# Patient Record
Sex: FEMALE | Race: WHITE | Employment: OTHER | URBAN - METROPOLITAN AREA
[De-identification: names, ages, dates, MRNs, and addresses within clinical notes are randomized per-mention and may not be internally consistent; named-entity substitution may affect disease eponyms.]

---

## 2021-08-03 ENCOUNTER — HOSPITAL ENCOUNTER (OUTPATIENT)
Dept: PHYSICAL THERAPY | Age: 81
Discharge: HOME | End: 2021-08-03
Payer: MEDICARE

## 2021-08-03 PROCEDURE — 97110 THERAPEUTIC EXERCISES: CPT

## 2021-08-03 PROCEDURE — 97161 PT EVAL LOW COMPLEX 20 MIN: CPT

## 2021-08-03 NOTE — PROGRESS NOTES
PT DAILY TREATMENT NOTE/HIP RENA03-00    Patient Name: Harrison Vargas  Date:8/3/2021  : 1940  [x]  Patient  Verified  Payor: Kayla Gunders[place] / Plan: [place] MED[place] & B / Product Type: Medicare /    In OOFX:[id number]  Out EWYC:2665  Total Treatment Time (min): 61  Visit #: 1 of 24    Medicare/BCBS Only   Total Timed Codes (min):  23 1:1 Treatment Time:  59     Treatment Area: Right hip pain [M25.551]    SUBJECTIVE  Pain Level (0-10 scale): 2  []constant []intermittent []improving []worsening []no change since onset    Any medication changes, allergies to medications, adverse drug reactions, diagnosis change, or new procedure performed?: [x] No    [] Yes (see summary sheet for update)  Subjective functional status/changes:     Patient presents with right hip pain and difficulty walking s/p right partial hip replacement via posterior lateral approach performed on 2021 secondary to falling on July 3, 2021, while working in kitchen and tripped over a dog bowl resulting in hitting her head on the wall and fracturing her hip. Patient does not remember accident secondary to head trauma. Patient describes pain as soreness, dullness. Denies numbness/tingling. Denies popping/clicking. Aggravating factors:walking, sleeping. Alleviating factors: movement. Denies red flags: SOB, chest pain, dizziness/lightheadedness, blurred/double vision, HA, chills/fevers, night sweats, change in bowel/bladder control, abdominal pain, difficulty swallowing, slurred speech, unexplained weight gain/loss, nausea, vomiting. PMHx: thyroidism. Surgical Hx: colectomy Social Hx: Lives with  in a single story home with 4 steps to enter, alcohol, tobacco, work status. PLOF: exercise, gardening, yard work. CLOF: limited with weight bearing.  Diagnostic Imaging: Surgery     OBJECTIVE/EXAMINATION    36 min [x]Eval                  []Re-Eval       23 min Therapeutic Exercise:  [x] See flow sheet :   Rationale: increase ROM, increase strength and decrease pain to improve the patients ability to complete ADLs       With   [x] TE   [] TA   [] neuro   [] other: Patient Education: [x] Review HEP    [] Progressed/Changed HEP based on:   [] positioning   [] body mechanics   [] transfers   [] heat/ice application     [] other:        Physical Therapy Evaluation- Hip    Posture: Forward head and rounded shoulders    Gait:  [] Normal    [x] Abnormal    [x] Antalgic    [] NWB    Device: 3WW    Describe: Patient ambulates with modified independence utilizing a 3WW while demonstrating an antalgic gait pattern as she has reduced stance time on the right and decreased step length on the left. She decreases shanti significantly to negotiate turns and demonstrates fear avoidance behavior when ambulating a she is afraid to fall. ROM/Strength        AROM                   Strength (1-5)  Hip Left Right Left Right   Flexion 115 95 4 4-   Extension   4 3+   Abduction   4- 3+   Adduction   4- 4-   ER 35 25     IR 20 NT     Knee Left Right Left Right   Extension   4 4-   Flexion   4 4-                                     Palpation  [] Min  [x] Mod  [] Severe    Location: right hip   [] Min  [] Mod  [] Severe    Location:    Optional Tests    Other tests/ comments:  Surgical wound healing well no signs of infection, edema is present in posterior hip. Patient required significant UEA to perform SLS    TUG 14 seconds    Pain Level (0-10 scale) post treatment: 2    ASSESSMENT/Changes in Function:   Patient presents with right hip pain and difficulty walking s/p right partial hip replacement via posterior lateral approach performed on 7/5/2021 secondary to falling on July 3, 2021, while working in kitchen and tripped over a dog bowl resulting in hitting her head on the wall and fracturing her hip. Patient does not remember accident secondary to head trauma. She has decreased hip mobility. She has reduced strength in bilateral LEs right more than left. Patient ambulates with modified independence utilizing a 3WW while demonstrating an antalgic gait pattern as she has reduced stance time on the right and decreased step length on the left. She decreases shanti significantly to negotiate turns and demonstrates fear avoidance behavior when ambulating a she is afraid to fall. She has reduced balance and requires UEA to perform SLS. Patient presentation is consistent s/p partial hip replacement secondary to trauma. Patient will benefit from skilled PT services to modify and progress therapeutic interventions, address functional mobility deficits, address ROM deficits, address strength deficits, analyze and address soft tissue restrictions, analyze and cue movement patterns, analyze and modify body mechanics/ergonomics and assess and modify postural abnormalities to attain her goals.      [x]  See Plan of Care  []  See progress note/recertification  []  See Discharge Summary         Progress towards goals / Updated goals:  See POC    PLAN  []  Upgrade activities as tolerated     [x]  Continue plan of care  []  Update interventions per flow sheet       []  Discharge due to:_  []  Other:_      Mitchell Dominique, PT, DPT 8/3/2021  8:46 AM

## 2021-08-03 NOTE — PROGRESS NOTES
[facility] at [address] [place] [address] Drive: [phone]   Fax: [phone]  PLAN OF CARE / [address] [place] [address] FOR PHYSICAL THERAPY SERVICES  Patient Name: Cristy Leiva : 1940   Medical   Diagnosis: Right hip pain [M25.551] Treatment Diagnosis: Right hip pain   Onset Date: 2021     Referral Source: Sophie Boyle MD Start of Care [facility]): 8/3/2021   Prior Hospitalization: See medical history Provider #: [id number]   Prior Level of Function: exercise, gardening, yard work   Comorbidities: thyroid   Medications: Verified on Patient Summary List   The Plan of Care and following information is based on the information from the initial evaluation.   ===========================================================================================  Assessment / key information:  Cristy Leiva is a [de-identified] y.o.  female presents with  c/o right hip pain and difficulty walking s/p right partial hip replacement via posterior lateral approach performed on 2021 secondary to falling on July 3, 2021, while working in kitchen and tripped over a dog bowl resulting in hitting her head on the wall and fracturing her hip. Patient does not remember accident secondary to head trauma. She has decreased hip mobility. She has reduced strength in bilateral LEs right more than left. Patient ambulates with modified independence utilizing a 3WW while demonstrating an antalgic gait pattern as she has reduced stance time on the right and decreased step length on the left. She decreases shanti significantly to negotiate turns and demonstrates fear avoidance behavior when ambulating a she is afraid to fall. She has reduced balance and requires UEA to perform SLS. Patient presentation is consistent s/p partial hip replacement secondary to trauma.  Patient will benefit from skilled PT services to modify and progress therapeutic interventions, address functional mobility deficits, address ROM deficits, address strength deficits, analyze and address soft tissue restrictions, analyze and cue movement patterns, analyze and modify body mechanics/ergonomics and assess and modify postural abnormalities to attain her goals. .      ===========================================================================================  Eval Complexity: History MEDIUM  Complexity : 1-2 comorbidities / personal factors will impact the outcome/ POC ;  Examination  LOW Complexity : 1-2 Standardized tests and measures addressing body structure, function, activity limitation and / or participation in recreation ; Presentation LOW Complexity : Stable, uncomplicated ;  Decision Making MEDIUM Complexity : FOTO score of 26-74; Overall Complexity LOW   Problem List: pain affecting function, decrease ROM, decrease strength, edema affecting function, impaired gait/ balance, decrease ADL/ functional abilitiies, decrease activity tolerance, decrease flexibility/ joint mobility and decrease transfer abilities   Treatment Plan may include any combination of the following: Therapeutic exercise, Therapeutic activities, Neuromuscular re-education, Physical agent/modality, Gait/balance training, Manual therapy, Aquatic therapy, Patient education, Self Care training, Functional mobility training, Home safety training and Stair training  Patient / Family readiness to learn indicated by: asking questions, trying to perform skills and interest  Persons(s) to be included in education: patient (P)  Barriers to Learning/Limitations: no  Measures taken if barriers to learning:   Patient Goal (s): \"walk without help\"   Patient self reported health status: good  Rehabilitation Potential: good  Goals:  Short Term Goals: To be accomplished in 4 weeks:   Patient will report compliance with HEP 1x/day to aid in rehabilitation program.   Status at IE:Provided initial HEP   Current:Same as IE    Long Term Goals:  To be accomplished in 8 weeks:   Patient will increase bilateral LE strength to 5/5 MMT throughout to aid in completion of ADLs. Status at IE:3+/5   Current:Same as IE     Patient will report pain no greater than 1-2/10 throughout entire day to aid in completion of ADLs   Status at IE:2-5/10   Current:Same as IE     Patent will perform 10 sit<>stands pain-free to demonstrate improvement in status and aid and completion of ADLs. Status at IE:Requires UEA to perform STS tansfer   Current:Same as IE     Patient will improve FOTO score to 68 points to demonstrate improvement in functional status. Status at IE: FOTO score = 43 (an established functional score where 100 = no disability)   Current:Same as IE    Frequency / Duration:   Patient to be seen 3  times per week for 12  weeks:  Patient / Caregiver education and instruction: self care and exercises      Therapist Signature: Rick Wright PT, DPT Date: 1/3/2146   Certification Period: 8/3/2021 - 11/1/2021 Time: 12:48 PM   ===========================================================================================  I certify that the above Physical Therapy Services are being furnished while the patient is under my care. I agree with the treatment plan and certify that this therapy is necessary. Physician Signature:        Date:       Time:     Please sign and return to [facility] or you may fax the signed copy to [phone]. Thank you.

## 2021-08-04 ENCOUNTER — HOSPITAL ENCOUNTER (OUTPATIENT)
Dept: PHYSICAL THERAPY | Age: 81
Discharge: HOME | End: 2021-08-04
Payer: MEDICARE

## 2021-08-04 PROCEDURE — 97110 THERAPEUTIC EXERCISES: CPT

## 2021-08-04 PROCEDURE — 97530 THERAPEUTIC ACTIVITIES: CPT

## 2021-08-04 NOTE — PROGRESS NOTES
PT DAILY TREATMENT NOTE - [facility]     Patient Name: Belén Herrera  Date:2021  : 1940  [x]  Patient  Verified  Payor: Fiordaliza Botello / Plan: [place] MEDICARE PART A & B / Product Type: Medicare /    In time:930  Out time:  Total Treatment Time (min): 38  Total Timed Codes (min): 38   1:1 Treatment Time ( [address] only): 38   Visit #: 2 of 24    Treatment Area: Right hip pain [M25.551]    SUBJECTIVE  Pain Level (0-10 scale): 2  Any medication changes, allergies to medications, adverse drug reactions, diagnosis change, or new procedure performed?: [x] No    [] Yes (see summary sheet for update)  Subjective functional status/changes:   [] No changes reported    Patient reports right hip soreness and tightness. OBJECTIVE    30 min Therapeutic Exercise:  [x] See flow sheet :   Rationale: increase ROM, increase strength and decrease pain to improve the patients ability to complete ADLs    8 min Therapeutic Activity:  [x]  See flow sheet :   Rationale: increase ROM, increase strength and improve coordination  to improve the patients ability to complete ADLs           With   [x] TE   [] TA   [] neuro   [] other: Patient Education: [x] Review HEP    [] Progressed/Changed HEP based on:   [] positioning   [] body mechanics   [] transfers   [] heat/ice application    [] other:      Other Objective/Functional Measures: NA     Pain Level (0-10 scale) post treatment: 2    ASSESSMENT/Changes in Function: Patient responds well to treatment session. Patient required cues for activity pacing. Provided close supervision with weight bearing activities for safety. Reviewed HEP to promote good carryover at home. Also reviewed hip surgical precautions and she demonstrated understanding. No adverse effects were noted from today's treatment session.     Patient will continue to benefit from skilled PT services to modify and progress therapeutic interventions, address functional mobility deficits, address ROM deficits, address strength deficits, analyze and address soft tissue restrictions, analyze and cue movement patterns, analyze and modify body mechanics/ergonomics, assess and modify postural abnormalities, and instruct in home and community integration to attain remaining goals. []  See Plan of Care  []  See progress note/recertification  []  See Discharge Summary         Progress towards goals / Updated goals:  Short Term Goals: To be accomplished in 4 weeks:                 Patient will report compliance with HEP 1x/day to aid in rehabilitation program.                 Status at IE:Provided initial HEP                 Current: In-progress, reviewed HEP, 8/4/2021     Long Term Goals: To be accomplished in 8 weeks:                 Patient will increase bilateral LE strength to 5/5 MMT throughout to aid in completion of ADLs. Status at IE:3+/5                 Current:Same as IE                    Patient will report pain no greater than 1-2/10 throughout entire day to aid in completion of ADLs                 Status at IE:2-5/10                 Current:Same as IE                    Patent will perform 10 sit<>stands pain-free to demonstrate improvement in status and aid and completion of ADLs. Status at IE:Requires UEA to perform STS tansfer                 Current:Same as IE                    Patient will improve FOTO score to 68 points to demonstrate improvement in functional status.                  Status at IE: FOTO score = 43 (an established functional score where 100 = no disability)                 Current:Same as IE    PLAN  []  Upgrade activities as tolerated     [x]  Continue plan of care  []  Update interventions per flow sheet       []  Discharge due to:_  []  Other:_      Samm Garay, PT, DPT 8/4/2021  9:34 AM    Future Appointments   Date Time Provider [place]   8/10/2021  1:00 PM Alba Mederos [facility]   8/12/2021  1:00 PM Alxeandrea Chowdhury, DORI FULTON [facility] 8/16/2021 11:00 AM Celester Anon, PT [facility]   8/18/2021  1:45 PM Celester Anon, PT [facility]   8/23/2021  2:30 PM Celester Anon, PT [facility]   8/25/2021  2:30 PM Celester Anon, PT [facility]   8/27/2021  1:00 PM Shena Peace, PT [facility]   8/31/2021  2:30 PM Celester Anon, PT [facility]   9/2/2021  2:30 PM Celester Anon, PT [facility]   9/7/2021  2:30 PM Celester Anon, PT [facility]   9/9/2021  2:30 PM Celester Anon, PT [facility]   9/14/2021  2:30 PM Celester Anon, PT [facility]   9/16/2021  2:30 PM Celester Anon, PT [facility]

## 2021-08-06 ENCOUNTER — HOSPITAL ENCOUNTER (OUTPATIENT)
Dept: PHYSICAL THERAPY | Age: 81
Discharge: HOME | End: 2021-08-06
Payer: MEDICARE

## 2021-08-06 PROCEDURE — 97110 THERAPEUTIC EXERCISES: CPT

## 2021-08-06 PROCEDURE — 97530 THERAPEUTIC ACTIVITIES: CPT

## 2021-08-06 NOTE — PROGRESS NOTES
PT DAILY TREATMENT NOTE    Patient Name: Chris Bliss  Date:2021  : 1940  [x]  Patient  Verified  Payor: [place] MEDICARE / Plan: [place] MEDICARE PART A & B / Product Type: Medicare /    In time:11:00  Out time:11:40  Total Treatment Time (min): 40  Total Timed Codes (min): 40  1:1 Treatment Time ( only): 40   Visit #: 3 of 24    Treatment Area: Right hip pain [M25.551]    SUBJECTIVE  Pain Level (0-10 scale): 0/10  Any medication changes, allergies to medications, adverse drug reactions, diagnosis change, or new procedure performed?: [x] No    [] Yes (see summary sheet for update)  Subjective functional status/changes:   [] No changes reported  Pt reports she felt good after last session, she was a little sore, but it wasn't too bad. Pt reports the swelling continues to come down. OBJECTIVE      30 min Therapeutic Exercise:  [x] See flow sheet : pt required cuing during nustep to keep right knee straight up toward the ceiling as patient had it adducted and internally rotated. This helped to increase right hip external rotators. Pt required cuing during added LAQ to maintain hep precautions of not flexing past 90 degrees. Pt required visual and verbal cuing to perform SAQ correctly and decrease glut overcompensation and activation. Rationale: increase ROM, increase strength, improve coordination and increase proprioception to improve the patients ability to increase patient's ease with performing functional activities and decrease overall pain and symptoms. 10 min Therapeutic Activity:  [x]  See flow sheet : pt required cuing during sit to stands to ensure pt was keeping her chest up in order to not flex her trunk forward and break hip precautions. Pt was also verbally and visually cued to decrease bilateral dynamic knee valgus.     Rationale: increase strength, improve coordination, improve balance and increase proprioception  to improve the patients ability to increase patient's ease with performing functional activities and decrease overall pain and symptoms. With   [x] TE   [x] TA   [] neuro   [] other: Patient Education: [x] Review HEP    [] Progressed/Changed HEP based on:   [] positioning   [] body mechanics   [] transfers   [] heat/ice application    [x] other: reviewed hip precautions with pt, no bending over at the waist, no bending past 90, bringing knee up to chest past 90 degrees and no crossing legs. Other Objective/Functional Measures: pt enters gyms with tripod rollator. Scar: right lateral hip: healing well no s/s of infection, light bruising noted right gluteal/rotator area. Pain Level (0-10 scale) post treatment: 3/10    ASSESSMENT/Changes in Function: Patient tolerated treatment well with no adverse reactions today. Pt is progressing with therapy as indicated by pt tolerating increase in exercise repetitions and resistance. Patient required cuing for correct form with exercises as indicated above. Patient's scar is healing very well, some bruising noted in right gluteal region, but minimal. Pt is very tender to palpation in this area still. Pt demonstrates decreased right hip strength and control as indicated by dynamic bilateral knee valgus progression with most exercises. Following cuing pt was able to correct. Although showing progress patient would benefit from continuation of skilled physical therapy to address the remaining limitations. Patient will continue to benefit from skilled PT services to modify and progress therapeutic interventions, address functional mobility deficits, address ROM deficits, address strength deficits, analyze and address soft tissue restrictions, analyze and cue movement patterns, analyze and modify body mechanics/ergonomics and assess and modify postural abnormalities to attain remaining goals.      []  See Plan of Care  []  See progress note/recertification  []  See Discharge Summary         Progress towards goals / Updated goals:  Short Term Goals: To be accomplished in 4 weeks:                 AKBARK will report compliance with HEP 1x/day to aid in rehabilitation program.                 Status at IE:Provided initial HEP                 Current:Patient reports good compliance with HEP with no difficulties, progressing well. 8/6/21     Long Term Goals: To be accomplished in 8 weeks:                 WCTORYM will increase bilateral LE strength to 5/5 MMT throughout to aid in completion of ADLs.                Status at IE:3+/5                 Current:Same as IE                    Patient will report pain no greater than 1-2/10 throughout entire day to aid in completion of ADLs                 Status at IE:2-5/10                 Current:Same as IE                    Patent will perform 10 sit<>stands pain-free to demonstrate improvement in status and aid and completion of ADLs.                Status at IE:Requires UEA to perform STS tansfer                 Current:Same as IE                    Patient will improve FOTO score to 68 points to demonstrate improvement in functional status.                  Status at IE: FOTO score = 43 (an established functional score where 100 = no disability)                 Current:Same as IE       PLAN  [x]  Upgrade activities as tolerated     [x]  Continue plan of care  [x]  Update interventions per flow sheet       []  Discharge due to:_  []  Other:_      Asa Feliz PT 8/6/2021  11:03 AM    Future Appointments   Date Time Provider [place]   8/10/2021  1:00 PM Alba Vasquez [facility]   8/12/2021  1:00 PM Gatha Sever, PT [facility]   8/16/2021 11:00 AM Gatha Sever, PT [facility]   8/18/2021  1:45 PM Gatha Sever, PT [facility]   8/23/2021  2:30 PM Gatha Sever, PT [facility]   8/25/2021  2:30 PM Gatha Sever, PT [facility]   8/27/2021  1:00 PM Wale Peace PT [facility]   8/31/2021  2:30 PM Gatha Sever, PT [facility]   9/2/2021  2:30 PM Rito Harvye, PT [facility]   9/7/2021  2:30 PM Rito Harvey, PT [facility]   9/9/2021  2:30 PM Rito Harvey, PT [facility]   9/14/2021  2:30 PM Rito Harvey, PT [facility]   9/16/2021  2:30 PM Rito Harvey, PT [facility]

## 2021-08-10 ENCOUNTER — HOSPITAL ENCOUNTER (OUTPATIENT)
Dept: PHYSICAL THERAPY | Age: 81
Discharge: HOME | End: 2021-08-10
Payer: MEDICARE

## 2021-08-10 PROCEDURE — 97530 THERAPEUTIC ACTIVITIES: CPT

## 2021-08-10 PROCEDURE — 97110 THERAPEUTIC EXERCISES: CPT

## 2021-08-10 NOTE — PROGRESS NOTES
PT DAILY TREATMENT NOTE    Patient Name: Alba Falcon  Date:8/10/2021  : 1940  [x]  Patient  Verified  Payor: [place] MED[place]ARE / Plan: [place] MEDICARE PART A & B / Product Type: Medicare /    In time:12:58  Out time:1:43  Total Treatment Time (min): 45  Total Timed Codes (min): 45  1:1 Treatment Time ( [address] only): 39   Visit #: 4 of 24    Treatment Area: Right hip pain [M25.551]    SUBJECTIVE  Pain Level (0-10 scale): 2/10  Any medication changes, allergies to medications, adverse drug reactions, diagnosis change, or new procedure performed?: [x] No    [] Yes (see summary sheet for update)  Subjective functional status/changes:   [] No changes reported  Pt reports she felt fine after last session. OBJECTIVE      30 min Therapeutic Exercise:  [x] See flow sheet :   Rationale: increase ROM, increase strength, improve coordination and increase proprioception to improve the patients ability to increase patient's ease with performing functional activities and decrease overall pain and symptoms. 15 min Therapeutic Activity:  [x]  See flow sheet :   Rationale: increase strength, improve coordination, improve balance and increase proprioception  to improve the patients ability to increase patient's ease with performing functional activities and decrease overall pain and symptoms. With   [x] TE   [x] TA   [] neuro   [] other: Patient Education: [x] Review HEP    [] Progressed/Changed HEP based on:   [] positioning   [] body mechanics   [] transfers   [] heat/ice application    [x] other: pt was educated on scar massage to perform 2 times a day for 3-5 minutes. Other Objective/Functional Measures: pt entered gym with tripod rollator. Pain Level (0-10 scale) post treatment: 2-3/10    ASSESSMENT/Changes in Function: Patient tolerated treatment well with no adverse reactions today. Pt is progressing with therapy as indicated by pt tolerating increase in exercise repetitions and resistance.  Pt continues to demonstrate dynamic right knee valgus during sit to stands however, required less verbal and visual cuing to correct today. Pt required min A to move right LE into abduction range in supine due to decreased strength, pt did get a good adductor stretch with this movement. Initiated sitting right hip internal and external rotation isometrics today, pt with significant weakness with right hip external rotation. Educated patient on scar massage and pt demonstrated and reported understanding. Although showing progress patient would benefit from continuation of skilled physical therapy to address the remaining limitations. Patient will continue to benefit from skilled PT services to modify and progress therapeutic interventions, address functional mobility deficits, address ROM deficits, address strength deficits, analyze and address soft tissue restrictions, analyze and cue movement patterns, analyze and modify body mechanics/ergonomics and assess and modify postural abnormalities to attain remaining goals. []  See Plan of Care  []  See progress note/recertification  []  See Discharge Summary         Progress towards goals / Updated goals:  Short Term Goals: To be accomplished in 4 weeks:                 DFYRVAM will report compliance with HEP 1x/day to aid in rehabilitation program.                 Status at IE:Provided initial HEP                 Current:Patient reports good compliance with HEP with no difficulties, progressing well. 8/6/21     Long Term Goals: To be accomplished in 8 weeks:                 JMHDLIX will increase bilateral LE strength to 5/5 MMT throughout to aid in completion of ADLs.                  Status at IE:3+/5                 Current:Same as IE                    Patient will report pain no greater than 1-2/10 throughout entire day to aid in completion of ADLs                 Status at IE:2-5/10                 Current:Same as IE                    Patent will perform 10 sit<>stands pain-free to demonstrate improvement in status and aid and completion of ADLs.                Status at IE:Requires UEA to perform STS tansfer                 DWIURCN: required min UEA to perform STS transfer 2 sets of 10 with no more than 3/10 pain. Progressing 8/10/21                    Patient will improve FOTO score to 68 points to demonstrate improvement in functional status.                Status at IE: FOTO score = 43 (an established functional score where 100 = no disability)                 Current:Same as IE       PLAN  [x]  Upgrade activities as tolerated     [x]  Continue plan of care  [x]  Update interventions per flow sheet       []  Discharge due to:_  [x]  Other:_  FOTO to be assessed next visit.      Enrique Cooper 8/10/2021  1:00 PM    Future Appointments   Date Time Provider [place]   8/12/2021  1:00 PM Melissa Bhakta, PT [facility]   8/16/2021 11:00 AM Melissa Bhakta, PT [facility]   8/18/2021  1:45 PM Braxtone Livia, PT [facility]   8/23/2021  2:30 PM Braxtone Livia, PT [facility]   8/25/2021  2:30 PM Braxtone Livia, PT [facility]   8/27/2021  1:00 PM Kimberli Peace, PT [facility]   8/31/2021  2:30 PM Braxtone Livia, PT [facility]   9/2/2021  2:30 PM Clarance Livia, PT [facility]   9/7/2021  2:30 PM Clarance Livia, PT [facility] [place]   9/9/2021  2:30 PM Clarance Livia, PT [facility]   9/14/2021  2:30 PM Clarance Livia, PT [facility]   9/16/2021  2:30 PM Melissa Bhakta PT [facility]

## 2021-08-12 ENCOUNTER — APPOINTMENT (OUTPATIENT)
Dept: PHYSICAL THERAPY | Age: 81
End: 2021-08-12
Payer: MEDICARE

## 2021-08-16 ENCOUNTER — HOSPITAL ENCOUNTER (OUTPATIENT)
Dept: PHYSICAL THERAPY | Age: 81
Discharge: HOME | End: 2021-08-16
Payer: MEDICARE

## 2021-08-16 PROCEDURE — 97530 THERAPEUTIC ACTIVITIES: CPT

## 2021-08-16 PROCEDURE — 97110 THERAPEUTIC EXERCISES: CPT

## 2021-08-16 PROCEDURE — 97112 NEUROMUSCULAR REEDUCATION: CPT

## 2021-08-16 NOTE — PROGRESS NOTES
PT DAILY TREATMENT NOTE - [facility]     Patient Name: Akash Dykes  Date:2021  : 1940  [x]  Patient  Verified  Payor: Hetal Caldera / Plan: [place] MEDICARE PART A & B / Product Type: Medicare /    In time:1100  Out time:1145  Total Treatment Time (min): 45  Total Timed Codes (min): 45   1:1 Treatment Time ( [address] only): 39   Visit #: 5 of 24    Treatment Area: Right hip pain [M25.551]    SUBJECTIVE  Pain Level (0-10 scale):1- 2  Any medication changes, allergies to medications, adverse drug reactions, diagnosis change, or new procedure performed?: [x] No    [] Yes (see summary sheet for update)  Subjective functional status/changes:   [] No changes reported  Patient rpeorts that she is still developing consistency with HEP as she is doing some of her exercises. OBJECTIVE    22 min Therapeutic Exercise:  [x] See flow sheet :   Rationale: increase ROM, increase strength and decrease pain to improve the patients ability to complete ADLs    15 min Therapeutic Activity:  [x]  See flow sheet :   Rationale: increase ROM, increase strength and improve coordination  to improve the patients ability to complete ADLs     8 min Neuromuscular Re-education:  -  See flow sheet :   Rationale: improve coordination, improve balance and increase proprioception  to improve the patients ability to complete ADLs          With   [x] TE   [] TA   [] neuro   [] other: Patient Education: [x] Review HEP    [] Progressed/Changed HEP based on:   [] positioning   [] body mechanics   [] transfers   [] heat/ice application    [] other:      Other Objective/Functional Measures: NA     Pain Level (0-10 scale) post treatment: 1    ASSESSMENT/Changes in Function: Patient responds well to treatment session. Provided encouragement through treatment for patient to complete all repetitions tasked. She required several cues to recall exercise parameters. She continues to display genu valgum with squat and bridge exercise.  Provided verbal cues and she was able to correct mechanics. No adverse effects were noted from today's treatment session    Patient will continue to benefit from skilled PT services to modify and progress therapeutic interventions, address functional mobility deficits, address ROM deficits, address strength deficits, analyze and address soft tissue restrictions, analyze and cue movement patterns, analyze and modify body mechanics/ergonomics, assess and modify postural abnormalities, address imbalance and instruct in home and community integration to attain remaining goals. []  See Plan of Care  []  See progress note/recertification  []  See Discharge Summary         Progress towards goals / Updated goals:  Short Term Goals: To be accomplished in 4 weeks:                 ZZTTEXR will report compliance with HEP 1x/day to aid in rehabilitation program.                 Status at IE:Provided initial HEP                 IIDHTIP: In-progress, developing consistency with HEP, 8/16/2021     Long Term Goals: To be accomplished in 8 weeks:                 LQHBNFY will increase bilateral LE strength to 5/5 MMT throughout to aid in completion of ADLs.                Status at IE:3+/5                 Current:Same as IE                    Patient will report pain no greater than 1-2/10 throughout entire day to aid in completion of ADLs                 Status at IE:2-5/10                 Current:Same as IE                    Patent will perform 10 sit<>stands pain-free to demonstrate improvement in status and aid and completion of ADLs.                Status at IE:Requires UEA to perform STS tansfer                 ENPBOMF: required min UEA to perform STS transfer 2 sets of 10 with no more than 3/10 pain. Progressing 8/10/21                    Patient will improve FOTO score to 68 points to demonstrate improvement in functional status.                  Status at IE: FOTO score = 43 (an established functional score where 100 = no disability)                 Current:Same as IE    PLAN  []  Upgrade activities as tolerated     [x]  Continue plan of care  []  Update interventions per flow sheet       []  Discharge due to:_  []  Other:_      Muna Cooper, PT, DPT 8/16/2021  11:42 AM    Future Appointments   Date Time Provider [place]   8/18/2021  1:45 PM Rito Harvey, PT [facility]   8/23/2021  2:30 PM Rito Harvey, PT [facility]   8/25/2021  2:30 PM Rito Harvey, PT [facility]   8/27/2021  1:00 PM Ruby Peace, PT [facility]   8/31/2021  2:30 PM Rito Harvey, PT [facility]   9/2/2021  2:30 PM Rito Harvey, PT [facility]   9/7/2021  2:30 PM Rito Harvey, PT [facility]   9/9/2021  2:30 PM Rito Harvey, PT [facility]   9/14/2021  2:30 PM Rito Harvey, PT [facility]   9/16/2021  2:30 PM Rito Harvey, PT [facility]

## 2021-08-18 ENCOUNTER — HOSPITAL ENCOUNTER (OUTPATIENT)
Dept: PHYSICAL THERAPY | Age: 81
Discharge: HOME | End: 2021-08-18
Payer: MEDICARE

## 2021-08-18 PROCEDURE — 97110 THERAPEUTIC EXERCISES: CPT

## 2021-08-18 PROCEDURE — 97530 THERAPEUTIC ACTIVITIES: CPT

## 2021-08-18 NOTE — PROGRESS NOTES
PT DAILY TREATMENT NOTE - [facility]     Patient Name: Alba Falcon  Date:2021  : 1940  [x]  Patient  Verified  Payor: [place] MEDICARE / Plan: [place] MEDICARE PART A & B / Product Type: Medicare /    In time:1347  Out time:1430  Total Treatment Time (min): 43  Total Timed Codes (min): 43   1:1 Treatment Time ([facility] only): 37   Visit #: 6 of 24    Treatment Area: Right hip pain [M25.551]    SUBJECTIVE  Pain Level (0-10 scale): 0  Any medication changes, allergies to medications, adverse drug reactions, diagnosis change, or new procedure performed?: [x] No    [] Yes (see summary sheet for update)  Subjective functional status/changes:   [] No changes reported    Patient reports that she feels tired today as she had a doctor appointment this morning. OBJECTIVE    33 min Therapeutic Exercise:  [x] See flow sheet :   Rationale: increase ROM, increase strength and decrease pain to improve the patients ability to complete ADLs    10 min Therapeutic Activity:  [x]  See flow sheet :   Rationale: increase ROM, increase strength and improve coordination  to improve the patients ability to complete ADLs        With   [x] TE   [] TA   [] neuro   [] other: Patient Education: [x] Review HEP    [] Progressed/Changed HEP based on:   [] positioning   [] body mechanics   [] transfers   [] heat/ice application    [] other:      Other Objective/Functional Measures: NA     Pain Level (0-10 scale) post treatment: 0    ASSESSMENT/Changes in Function: Patient responds well to treatment session. Patient required CGA with marching other weight bearing activities. Provided verbal cues to reduced trunk compensatory strategies with hip abduction. Provided assitance with SB hamstring curl to promote proper mechanics while maintaining hip precautions.  No adverse effects were noted from today's treatment session      Patient will continue to benefit from skilled PT services to modify and progress therapeutic interventions, address functional mobility deficits, address ROM deficits, address strength deficits, analyze and address soft tissue restrictions, analyze and cue movement patterns, analyze and modify body mechanics/ergonomics, assess and modify postural abnormalities, address imbalance and instruct in home and community integration to attain remaining goals. []  See Plan of Care  []  See progress note/recertification  []  See Discharge Summary         Progress towards goals / Updated goals:  Short Term Goals: To be accomplished in 4 weeks:                 CZIIDDP will report compliance with HEP 1x/day to aid in rehabilitation program.                 Status at IE:Provided initial HEP                 LZEJARV: In-progress, developing consistency with HEP, 8/16/2021     Long Term Goals: To be accomplished in 8 weeks:                 XHUOAXL will increase bilateral LE strength to 5/5 MMT throughout to aid in completion of ADLs.                Status at IE:3+/5                 Current:Same as IE                    Patient will report pain no greater than 1-2/10 throughout entire day to aid in completion of ADLs                 Status at IE:2-5/10                 Current:Same as IE                    Patent will perform 10 sit<>stands pain-free to demonstrate improvement in status and aid and completion of ADLs.                Status at IE:Requires UEA to perform STS tansfer                 Current: required min UEA to perform STS transfer 2 sets of 10 with no more than 3/10 pain. Progressing 8/10/21                    Patient will improve FOTO score to 68 points to demonstrate improvement in functional status.                  Status at IE: FOTO score = 43 (an established functional score where 100 = no disability)                 Current:Same as IE    PLAN  []  Upgrade activities as tolerated     [x]  Continue plan of care  []  Update interventions per flow sheet       []  Discharge due to:_  []  Other:_      Muna Cooper, PT, DPT 8/18/2021  1:40 PM    Future Appointments   Date Time Provider [place]   8/18/2021  1:45 PM El Flirt, PT [facility] [place]   8/23/2021  2:30 PM El Flirt, PT [facility] [place]   8/25/2021  2:30 PM El Flirt, PT [facility]   8/27/2021  1:00 PM Kb Peace, PT [facility]   8/31/2021  2:30 PM El Flirt, PT [facility] [place]   9/2/2021  2:30 PM El Flirt, PT [facility] [place]   9/7/2021  2:30 PM El Flirt, PT [facility] [place]   9/9/2021  2:30 PM El Flirt, PT [facility]   9/14/2021  2:30 PM El Flirt, PT [facility]   9/16/2021  2:30 PM El Flirt, PT [facility]

## 2021-08-23 ENCOUNTER — HOSPITAL ENCOUNTER (OUTPATIENT)
Dept: PHYSICAL THERAPY | Age: 81
Discharge: HOME | End: 2021-08-23
Payer: MEDICARE

## 2021-08-23 PROCEDURE — 97530 THERAPEUTIC ACTIVITIES: CPT

## 2021-08-23 PROCEDURE — 97110 THERAPEUTIC EXERCISES: CPT

## 2021-08-23 NOTE — PROGRESS NOTES
PT DAILY TREATMENT NOTE - [facility]     Patient Name: Harrison Vargas  Date:2021  : 1940  [x]  Patient  Verified  Payor: Kayla Juarez / Plan: [place] MEDICARE PART A & B / Product Type: Medicare /    In time:1430  Out time:1515  Total Treatment Time (min): 45  Total Timed Codes (min): 203   1:1 Treatment Time ( [address] only): 39    Visit #: 7 of 24    Treatment Area: Right hip pain [M25.551]     SUBJECTIVE  Pain Level (0-10 scale): 0  Any medication changes, allergies to medications, adverse drug reactions, diagnosis change, or new procedure performed?: [x] No    [] Yes (see summary sheet for update)  Subjective functional status/changes:   [] No changes reported  Patient reports that she is still developing compliance with HEP. OBJECTIVE    35 min Therapeutic Exercise:  [] See flow sheet :   Rationale: increase ROM, increase strength and decrease pain to improve the patients ability to complete ADLs    10 min Therapeutic Activity:  []  See flow sheet :   Rationale: increase ROM, increase strength and improve coordination  to improve the patients ability to complete ADLs           With   [x] TE   [] TA   [] neuro   [] other: Patient Education: [x] Review HEP    [] Progressed/Changed HEP based on:   [] positioning   [] body mechanics   [] transfers   [] heat/ice application    [] other:      Other Objective/Functional Measures: NA     Pain Level (0-10 scale) post treatment: 0    ASSESSMENT/Changes in Function: Patient responds well to treatment session. Patient required steady cues to recall exercise parameters. She demonstrated improved activity tolerance having fewer c/o discomfort with exercise. Will advance exercise next visit.  No adverse effects were noted from today's treatment session    Patient will continue to benefit from skilled PT services to modify and progress therapeutic interventions, address functional mobility deficits, address ROM deficits, address strength deficits, analyze and address soft tissue restrictions, analyze and cue movement patterns, analyze and modify body mechanics/ergonomics, assess and modify postural abnormalities, address imbalance and instruct in home and community integration to attain remaining goals. []  See Plan of Care  []  See progress note/recertification  []  See Discharge Summary         Progress towards goals / Updated goals:  Short Term Goals: To be accomplished in 4 weeks:                 LNUUNEO will report compliance with HEP 1x/day to aid in rehabilitation program.                 Status at IE:Provided initial HEP                 Current: In-progress, developing consistency with HEP, 8/16/2021     Long Term Goals: To be accomplished in 8 weeks:                 KUZSGWF will increase bilateral LE strength to 5/5 MMT throughout to aid in completion of ADLs.                Status at IE:3+/5                 Current:Same as IE                    Patient will report pain no greater than 1-2/10 throughout entire day to aid in completion of ADLs                 Status at IE:2-5/10                 Current: In-progress, 0-3/10, 8/23/2021                    Patent will perform 10 sit<>stands pain-free to demonstrate improvement in status and aid and completion of ADLs.                Status at IE:Requires UEA to perform STS tansfer                 Current: required min UEA to perform STS transfer 2 sets of 10 with no more than 3/10 pain. Progressing 8/10/21                    Patient will improve FOTO score to 68 points to demonstrate improvement in functional status.                  Status at IE: FOTO score = 43 (an established functional score where 100 = no disability)                 Current:Same as IE    PLAN  []  Upgrade activities as tolerated     [x]  Continue plan of care  []  Update interventions per flow sheet       []  Discharge due to:_  []  Other:_      Eris Pérez, PT, DPT 8/23/2021  2:36 PM    Future Appointments   Date Time Provider Department Center   8/25/2021  2:30 PM [place], PT [facility] [place]   8/27/2021  1:00 PM Shikha Peace, PT [facility]   8/31/2021  2:30 PM [place] Cam, PT [facility]   9/2/2021  2:30 PM [place] Cam, PT [facility]   9/7/2021  2:30 PM [place] Cam, PT [facility]   9/9/2021  2:30 PM [place], [facility]   9/14/2021  2:30 PM [place] Cam, PT [facility]   9/16/2021  2:30 PM [place] Cam, PT [facility]

## 2021-08-25 ENCOUNTER — HOSPITAL ENCOUNTER (OUTPATIENT)
Dept: PHYSICAL THERAPY | Age: 81
Discharge: HOME | End: 2021-08-25
Payer: MEDICARE

## 2021-08-25 PROCEDURE — 97116 GAIT TRAINING THERAPY: CPT

## 2021-08-25 PROCEDURE — 97110 THERAPEUTIC EXERCISES: CPT

## 2021-08-25 PROCEDURE — 97530 THERAPEUTIC ACTIVITIES: CPT

## 2021-08-25 NOTE — PROGRESS NOTES
PT DAILY TREATMENT NOTE - [facility]     Patient Name: Mohinder Jacobo  Date:2021  : 1940  [x]  Patient  Verified  Payor: [place] MEDICARE / Plan: [place] MEDICARE PART A & B / Product Type: Medicare /    In time:1430  Out time:1525  Total Treatment Time (min): 55  Total Timed Codes (min): 55   1:1 Treatment Time ([facility] only): 55   Visit #: 8 of 24    Treatment Area: Right hip pain [M25.551]    SUBJECTIVE  Pain Level (0-10 scale): 2  Any medication changes, allergies to medications, adverse drug reactions, diagnosis change, or new procedure performed?: [x] No    [] Yes (see summary sheet for update)  Subjective functional status/changes:   [] No changes reported    Patient reports that she is doing well. She states that she has some hip soreness. She states that she brought a cane to learn how to use it. OBJECTIVE    35 min Therapeutic Exercise:  [x] See flow sheet :   Rationale: increase ROM, increase strength and decrease pain to improve the patients ability to complete ADLs    10 min Therapeutic Activity:  [x]  See flow sheet :   Rationale: increase ROM, increase strength and improve coordination  to improve the patients ability to complete ADLs    10 min Gait Trainin feet with SPC on level surfaces with close supervision level of assist   Rationale: With   [x] TE   [] TA   [] neuro   [] other: Patient Education: [x] Review HEP    [] Progressed/Changed HEP based on:   [] positioning   [] body mechanics   [] transfers   [] heat/ice application    [] other:      Other Objective/Functional Measures: NA     Pain Level (0-10 scale) post treatment: 0    ASSESSMENT/Changes in Function: Patient responds well to treatment session. Adjusted patient's cane to promote proper gait mechanics and safety. Educated patient on proper gait sequence with SPC. Patient was able to ambulate 480 feet with close supervision for safety.  No adverse effects were noted from today's treatment session    Patient will continue to benefit from skilled PT services to modify and progress therapeutic interventions, address functional mobility deficits, address ROM deficits, address strength deficits, analyze and address soft tissue restrictions, analyze and cue movement patterns, analyze and modify body mechanics/ergonomics, assess and modify postural abnormalities, address imbalance and instruct in home and community integration to attain remaining goals. []  See Plan of Care  []  See progress note/recertification  []  See Discharge Summary         Progress towards goals / Updated goals:  Short Term Goals: To be accomplished in 4 weeks:                 BPQACXQ will report compliance with HEP 1x/day to aid in rehabilitation program.                 Status at IE:Provided initial HEP                 Current: In-progress, developing consistency with HEP, 8/16/2021     Long Term Goals: To be accomplished in 8 weeks:                 CXTFZES will increase bilateral LE strength to 5/5 MMT throughout to aid in completion of ADLs.                Status at IE:3+/5                 Current:Same as IE                    Patient will report pain no greater than 1-2/10 throughout entire day to aid in completion of ADLs                 Status at IE:2-5/10                 Current: In-progress, 0-3/10, 8/23/2021                    Patent will perform 10 sit<>stands pain-free to demonstrate improvement in status and aid and completion of ADLs.                Status at IE:Requires UEA to perform STS tansfer                 Current: In-progress, Performed 2 x 10 STS from 19 inch seat height with no UEA, 8/25/2021                    Patient will improve FOTO score to 68 points to demonstrate improvement in functional status.                Status at IE: FOTO score = 43 (an established functional score where 100 = no disability)                 Current:  In-progress, 48, 8/18/2021    PLAN  []  Upgrade activities as tolerated     [x]  Continue plan of care  []  Update interventions per flow sheet       []  Discharge due to:_  []  Other:_      Gatito Hedrick, PT, DPT 8/25/2021  2:32 PM    Future Appointments   Date Time Provider [place]   8/27/2021  1:00 PM Zhanna Eden, PT [facility]   8/31/2021  2:30 PM Gale Liudmila, PT [facility]   9/2/2021  2:30 PM Gale Liudmila, PT [facility]   9/7/2021  2:30 PM Gale Liudmila, PT [facility]   9/9/2021  2:30 PM Gale Liudmila, PT [facility]   9/14/2021  2:30 PM Gale Liudmila, PT [facility]   9/16/2021  2:30 PM Gale Liudmila, PT [facility]

## 2021-08-27 ENCOUNTER — HOSPITAL ENCOUNTER (OUTPATIENT)
Dept: PHYSICAL THERAPY | Age: 81
Discharge: HOME | End: 2021-08-27
Payer: MEDICARE

## 2021-08-27 PROCEDURE — 97112 NEUROMUSCULAR REEDUCATION: CPT

## 2021-08-27 PROCEDURE — 97530 THERAPEUTIC ACTIVITIES: CPT

## 2021-08-27 PROCEDURE — 97110 THERAPEUTIC EXERCISES: CPT

## 2021-08-27 NOTE — PROGRESS NOTES
PT DAILY TREATMENT NOTE    Patient Name: Samia Ching  Date:2021  : 1940  [x]  Patient  Verified  Payor: Ericka Ward / Plan: [place] MEDICARE PART A & B / Product Type: Medicare /    In Time: 12:58  Out Time: 1:45  Total Treatment Time (min):     47  Total Timed Codes (min):         47  1:1 Treatment Time ( [address] only): 42   Visit #:      Treatment Area: Right hip pain [M25.551]    SUBJECTIVE  Pre-Treatment Pain Level (out of 0-10 scale): 2  Subjective Functional Status/Changes: \"I feel pretty good today, just some minor pain along my right hip today. \"    Any medication changes, allergies to medications, adverse drug reactions, diagnosis change, or new procedure performed?: [x] No    [] Yes (see summary sheet for update)    OBJECTIVE     23 min Therapeutic Exercise:  [x] See flow sheet   Rationale: increase ROM, increase strength, and decrease pain to improve the patients ability to perform ADLs    9 min Therapeutic Activity:  [x] See flow sheet   Rationale: increase ROM, increase strength, and improve coordination to improve the patients ability to perform ADLs    10 min Neuromuscular Re-Education:  [x] See flow sheet   Rationale: improve coordination, improve balance/proprioception, improve posture, and improve core stabilization to improve the patients ability to perform ADLs and improve stability during static/dynamic movements    With   [x] TE   [] TA   [] neuro   [] other: Patient Education: [x] Review HEP    [] Progressed/Changed HEP based on:   [] positioning   [] body mechanics   [] transfers   [] heat/ice application    [] other: verbal and/or tactile cueing prn to improve performance/body alignment during therapeutic exercises       Other Objective/Functional Measures: n/a    Post-Treatment Pain Level (out of 0-10 scale): 0    [x]  See Plan of Care  []  See Progress Note/Recertification  []  See Discharge Summary    ASSESSMENT  Patient responded well to today's treatment without any adverse reactions. Patient able to progress to 3 laps during moving marches today, though she did require handheld assist secondary to decreased balance and she also became fatigued about custodial into the third lap. She did well with the addition of further strengthening with: supine marching, supine clams with peach resistance band, as well as the increase to a 3# weight during standing hamstring curls. Overall making continual progress towards goals. Continue per POC. Patient will continue to benefit from skilled PT services to modify and progress therapeutic interventions, address functional mobility deficits, address ROM deficits, address strength deficits, analyze and address soft tissue restrictions, analyze and cue movement patterns, analyze and modify body mechanics/ergonomics, assess and modify postural abnormalities, and instruct in home and community integration to attain remaining goals. Progress Towards Goals/Updated Goals:  Short Term Goals: To be accomplished in 4 weeks:                 KALPESHCORRINA will report compliance with HEP 1x/day to aid in rehabilitation program.                 Status at IE:Provided initial HEP   Current: Patient states she tries to do \"some\" exercises every day, but she's not sure she's getting through all of them. 8/27/21, in progress     Long Term Goals: To be accomplished in 8 weeks:                 ROGELIOU will increase bilateral LE strength to 5/5 MMT throughout to aid in completion of ADLs.                Status at IE:3+/5                 Current:Same as IE                    Patient will report pain no greater than 1-2/10 throughout entire day to aid in completion of ADLs                 Status at IE:2-5/10                 Current: In-progress, 0-3/10, 8/23/2021                    Patent will perform 10 sit<>stands pain-free to demonstrate improvement in status and aid and completion of ADLs.                  Status at IE:Requires UEA to perform STS kiley                 Current: In-progress, Performed 2 x 10 STS from 19 inch seat height with no UEA, 8/25/2021                    Patient will improve FOTO score to 68 points to demonstrate improvement in functional status.                Status at IE: FOTO score = 43 (an established functional score where 100 = no disability)                 Current: In-progress, 48, 8/18/2021    PLAN  [x]  Continue per Plan of Care  []  Upgrade activities as tolerated       []  Update interventions per flow sheet       []  Discharge due to: _  []  Other: _      Silvino Minor.  Nata, PT, DPT, ATR         8/27/2021     7:56 AM    Future Appointments   Date Time Provider [place]   8/27/2021  1:00 PM Silvino Eden, PT [facility]   8/31/2021  2:30 PM Ewa Siskin, PT [facility]   9/2/2021  2:30 PM Ewa Siskin, PT [facility]   9/7/2021  2:30 PM Ewa Siskin, PT [facility]   9/9/2021  2:30 PM Ewa Siskin, PT [facility]   9/14/2021  2:30 PM Ewa Siskin, PT [facility]   9/16/2021  2:30 PM Ewa Siskin, PT [facility]

## 2021-08-31 ENCOUNTER — HOSPITAL ENCOUNTER (OUTPATIENT)
Dept: PHYSICAL THERAPY | Age: 81
Discharge: HOME | End: 2021-08-31
Payer: MEDICARE

## 2021-08-31 PROCEDURE — 97530 THERAPEUTIC ACTIVITIES: CPT

## 2021-08-31 PROCEDURE — 97110 THERAPEUTIC EXERCISES: CPT

## 2021-08-31 NOTE — PROGRESS NOTES
[facility] at [address] [place] [address] Drive: [phone]   Fax: [phone]  Progress Note  Patient Name: Luwana Lombard : 1940   Medical   Diagnosis: Right hip pain [M25.551] Treatment Diagnosis: Right hip pain   Onset Date: 2021     Referral Source: Cristobal Barnes MD Start of Care [facility]): 8/3/2021   Prior Hospitalization: See medical history Provider #: [id number]   Prior Level of Function: exercise, gardening, yard work   Comorbidities: thyroid   Medications: Verified on Patient Summary List      ===========================================================================================  Assessment / Summary of Care: Luwana Lombard is a [de-identified] y.o. 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 Patient is improving as she is progressing toward her short and long term goals. She is developing strength and functional mobility. She is demonstrating improved self-efficacy in her ability to ambulate as she is transitioning from walker to cane. She continues to demonstrates some deficits on balance. Will continue to focus on safe ambulation via balance activities while continuing to develop strength in bilateral LEs to reduce risk for falls.  Patient will continue to benefit from skilled PT services to modify and progress therapeutic interventions, address functional mobility deficits, address ROM deficits, address strength deficits, analyze and address soft tissue restrictions, analyze and cue movement patterns, analyze and modify body mechanics/ergonomics, assess and modify postural abnormalities, address imbalance and instruct in home and community integration to attain remaining goals.     ===========================================================================================    Plan:Continue therapy per initial plan/protocol at a frequency of  2 x per week for 4 weeks    Goals:   Short Term Goals: To be accomplished in 4 weeks:                 MHVLIAP will report compliance with HEP 1x/day to aid in rehabilitation program.                 Status at IE:Provided initial HEP              Current: Patient states she tries to do \"some\" exercises every day, but she's not sure she's getting through all of them.      8/27/21, in progress     Long Term Goals: To be accomplished in 8 weeks:                 Patient will increase bilateral LE strength to 5/5 MMT throughout to aid in completion of ADLs.                Status at IE:3+/5                 Current: In-progress, 4/5, 8/31/2021                    Patient will report pain no greater than 1-2/10 throughout entire day to aid in completion of ADLs                 Status at IE:2-5/10                 Current: Met 0-2/10 with ADLs, 8/31/2021                    Patent will perform 10 sit<>stands pain-free to demonstrate improvement in status and aid and completion of ADLs.                Status at IE:Requires UEA to perform STS tansfer                 Current: In-progress, Performed 2 x 10 STS from 19 inch seat height with no UEA, 8/25/2021                    Patient will improve FOTO score to 68 points to demonstrate improvement in functional status.                Status at IE: FOTO score = 43 (an established functional score where 100 = no disability)                 Current: In-progress, 48, 8/18/2021    ===========================================================================================  Subjective: Patient reports compliance with HEP. She states that she is walking more with cane at home.     Objective:   FOTO 48  MMT    4/5  Pain 0-2/10 with ADLs    Therapist Signature: Yael Conway PT, DPT Date: 3/27/1417   Re-Certification: NA Time: 3:23 PM         [facility] at [address]  Phone: [phone]   Fax: [phone]

## 2021-08-31 NOTE — PROGRESS NOTES
PT DAILY TREATMENT NOTE - [facility]     Patient Name: Cristy Leiva  Date:2021  : 1940  [x]  Patient  Verified  Payor: [place] MEDICARE / Plan: VA MEDICARE PART A & B / Product Type: Medicare /    In time:1435  Out time:1515  Total Treatment Time (min): 40  Total Timed Codes (min): 40   1:1 Treatment Time ( only): 40   Visit #: 10 of 24    Treatment Area: Right hip pain [M25.551]    SUBJECTIVE  Pain Level (0-10 scale): 0  Any medication changes, allergies to medications, adverse drug reactions, diagnosis change, or new procedure performed?: [x] No    [] Yes (see summary sheet for update)  Subjective functional status/changes:   [] No changes reported  Patient reports compliance with HEP. She states that she is walking more with cane at home. OBJECTIVE    30 min Therapeutic Exercise:  [x] See flow sheet :   Rationale: increase ROM, increase strength and decrease pain to improve the patients ability to complete ADLs    10 min Therapeutic Activity:  [x]  See flow sheet :   Rationale: increase ROM, increase strength and improve coordination  to improve the patients ability to complete ADLs           With   [x] TE   [] TA   [] neuro   [] other: Patient Education: [x] Review HEP    [] Progressed/Changed HEP based on:   [] positioning   [] body mechanics   [] transfers   [] heat/ice application    [] other:      Other Objective/Functional Measures:   FOTO 48  MMT 4/5  Pain 0-2/10 with ADLs    Pain Level (0-10 scale) post treatment: 0    ASSESSMENT/Changes in Function: Patient responds well to treatment session. No adverse effects were noted from today's treatment session. Patient is improving as she is progressing toward her short and long term goals. She is developing strength and functional mobility. She is demonstrating improved self-efficacy in her ability to ambulate as she is transitioning from walker to cane. She continues to demonstrates some deficits on balance.  Will continue to focus on safe ambulation via balance activities while continuing to develop strength in bilateral LEs to reduce risk for falls. Patient will continue to benefit from skilled PT services to modify and progress therapeutic interventions, address functional mobility deficits, address ROM deficits, address strength deficits, analyze and address soft tissue restrictions, analyze and cue movement patterns, analyze and modify body mechanics/ergonomics, assess and modify postural abnormalities, address imbalance and instruct in home and community integration to attain remaining goals. []  See Plan of Care  []  See progress note/recertification  []  See Discharge Summary         Progress towards goals / Updated goals:  Short Term Goals: To be accomplished in 4 weeks:                 JQMGBINHW will report compliance with HEP 1x/day to aid in rehabilitation program.                 Status at IE:Provided initial HEP              Current: Patient states she tries to do \"some\" exercises every day, but she's not sure she's getting through all of them. 8/27/21, in progress     Long Term Goals: To be accomplished in 8 weeks:                 SGUKZVJ will increase bilateral LE strength to 5/5 MMT throughout to aid in completion of ADLs.                Status at IE:3+/5                 Current: In-progress, 4/5, 8/31/2021                   Patient will report pain no greater than 1-2/10 throughout entire day to aid in completion of ADLs                 Status at IE:2-5/10                 Current: Met 0-2/10 with ADLs, 8/31/2021                    Patent will perform 10 sit<>stands pain-free to demonstrate improvement in status and aid and completion of ADLs.                  Status at IE:Requires UEA to perform STS tansfer                 Current: In-progress, Performed 2 x 10 STS from 19 inch seat height with no UEA, 8/25/2021                    Patient will improve FOTO score to 68 points to demonstrate improvement in functional status.                  Status at IE: FOTO score = 43 (an established functional score where 100 = no disability)                 Current: In-progress, 48, 8/18/2021  PLAN  []  Upgrade activities as tolerated     [x]  Continue plan of care  []  Update interventions per flow sheet       []  Discharge due to:_  []  Other:_      Jeanne Delatorre, PT, DPT 8/31/2021  2:40 PM    Future Appointments   Date Time Provider [place]   9/2/2021  2:30 PM Conor Benítez, PT [facility]   9/7/2021  2:30 PM Conor Benítez, PT [facility]   9/9/2021  2:30 PM Conor Benítez, PT [facility]   9/14/2021  2:30 PM Conor Benítez, PT [facility]   9/16/2021  2:30 PM Conor Benítez PT [facility]

## 2021-09-02 ENCOUNTER — HOSPITAL ENCOUNTER (OUTPATIENT)
Dept: PHYSICAL THERAPY | Age: 81
Discharge: HOME | End: 2021-09-02
Payer: MEDICARE

## 2021-09-02 PROCEDURE — 97110 THERAPEUTIC EXERCISES: CPT

## 2021-09-02 PROCEDURE — 97116 GAIT TRAINING THERAPY: CPT

## 2021-09-02 PROCEDURE — 97530 THERAPEUTIC ACTIVITIES: CPT

## 2021-09-02 NOTE — PROGRESS NOTES
PT DAILY TREATMENT NOTE - [facility]     Patient Name: Sonia Tenorio  Date:2021  : 1940  [x]  Patient  Verified  Payor: VA MEDICARE / Plan: VA MEDICARE PART A & B / Product Type: Medicare /    In time:1430  Out time:1515  Total Treatment Time (min): 45  Total Timed Codes (min): 45   1:1 Treatment Time ( [address] only): 39   Visit #: 11 of 24    Treatment Area: Right hip pain [M25.551]    SUBJECTIVE  Pain Level (0-10 scale): 1.5  Any medication changes, allergies to medications, adverse drug reactions, diagnosis change, or new procedure performed?: [x] No    [] Yes (see summary sheet for update)  Subjective functional status/changes:   [] No changes reported    Patient reports that she lost her cane and brought another one to therapy that needs adjusted for proper fit. OBJECTIVE       20 min Therapeutic Exercise:  [x] See flow sheet :   Rationale: increase ROM, increase strength and decrease pain to improve the patients ability to complete ADLs    15 min Therapeutic Activity:  [x]  See flow sheet :   Rationale: increase ROM, increase strength and improve coordination  to improve the patients ability to complete ADLs    10 min Gait Trainin feet with SPC on level surfaces with close supervision level of assist   Rationale: to promote ambulation to community distances with reduced risk for falls          With   [x] TE   [] TA   [] neuro   [] other: Patient Education: [x] Review HEP    [] Progressed/Changed HEP based on:   [] positioning   [] body mechanics   [] transfers   [] heat/ice application    [] other:      Other Objective/Functional Measures: NA     Pain Level (0-10 scale) post treatment: 0    ASSESSMENT/Changes in Function: Patient responds well to treatment session. Patient required close supervision for safety and verbal cues to promote proper step through gait sequence when ambulating with SPC. Adjusted cane for proper height to promote natural gait sequence.  No adverse effects were noted from today's treatment session    Patient will continue to benefit from skilled PT services to modify and progress therapeutic interventions, address functional mobility deficits, address ROM deficits, address strength deficits, analyze and address soft tissue restrictions, analyze and cue movement patterns, analyze and modify body mechanics/ergonomics, assess and modify postural abnormalities, address imbalance and instruct in home and community integration to attain remaining goals. []  See Plan of Care  []  See progress note/recertification  []  See Discharge Summary         Progress towards goals / Updated goals:  Short Term Goals: To be accomplished in 4 weeks:                 DELICIAJWVNTHEA will report compliance with HEP 1x/day to aid in rehabilitation program.                 Status at IE:Provided initial HEP              Current: Patient states she tries to do \"some\" exercises every day, but she's not sure she's getting through all of them.      8/27/21, in progress     Long Term Goals: To be accomplished in 8 weeks:                 Patient will increase bilateral LE strength to 5/5 MMT throughout to aid in completion of ADLs.                Status at IE:3+/5                 Current: In-progress, 4/5, 8/31/2021                    Patient will report pain no greater than 1-2/10 throughout entire day to aid in completion of ADLs                 Status at IE:2-5/10                 Current: Met 0-2/10 with ADLs, 8/31/2021                    Patent will perform 10 sit<>stands pain-free to demonstrate improvement in status and aid and completion of ADLs.                Status at IE:Requires UEA to perform STS tansfer                 Current: In-progress, Performed 2 x 10 STS from 19 inch seat height with no UEA, 8/25/2021                    Patient will improve FOTO score to 68 points to demonstrate improvement in functional status.                  Status at IE: FOTO score = 43 (an established functional score where 100 = no disability)                 Current: In-progress, 48, 8/18/2021    PLAN  []  Upgrade activities as tolerated     [x]  Continue plan of care  []  Update interventions per flow sheet       []  Discharge due to:_  []  Other:_      Nikolai Drake, PT, DPT 9/2/2021  2:53 PM    Future Appointments   Date Time Provider [place]   9/7/2021  2:30 PM Yessica Manriquez, PT [facility]   9/9/2021  2:30 PM Lugenia Fent, PT [facility]   9/14/2021  2:30 PM Lugenia Fent, PT [facility]   9/16/2021  2:30 PM Lugenia Fent, PT [facility]

## 2021-09-07 ENCOUNTER — HOSPITAL ENCOUNTER (OUTPATIENT)
Dept: PHYSICAL THERAPY | Age: 81
Discharge: HOME | End: 2021-09-07
Payer: MEDICARE

## 2021-09-07 PROCEDURE — 97110 THERAPEUTIC EXERCISES: CPT

## 2021-09-07 PROCEDURE — 97530 THERAPEUTIC ACTIVITIES: CPT

## 2021-09-07 PROCEDURE — 97112 NEUROMUSCULAR REEDUCATION: CPT

## 2021-09-07 NOTE — PROGRESS NOTES
PT DAILY TREATMENT NOTE - [facility]     Patient Name: Katlin Morales  Date:2021  : 1940  [x]  Patient  Verified  Payor: [place] MED[place]ARE / Plan: [place] MEDICARE PART A & B / Product Type: Medicare /    In time:1435  Out time:1515  Total Treatment Time (min): 40  Total Timed Codes (min): 40   1:1 Treatment Time ( only): 40   Visit #: 12 of 24    Treatment Area: Right hip pain [M25.551]    SUBJECTIVE  Pain Level (0-10 scale): 1  Any medication changes, allergies to medications, adverse drug reactions, diagnosis change, or new procedure performed?: [x] No    [] Yes (see summary sheet for update)  Subjective functional status/changes:   [] No changes reported  Patient reports that she is feeling better and is becoming more consistent with HEP. OBJECTIVE       30 min Therapeutic Exercise:  [x] See flow sheet :   Rationale: increase ROM, increase strength and decrease pain to improve the patients ability to complete ADLs    10 min Therapeutic Activity:  [x]  See flow sheet :   Rationale: increase ROM, increase strength and improve coordination  to improve the patients ability to complete ADLs          With   [x] TE   [] TA   [] neuro   [] other: Patient Education: [x] Review HEP    [] Progressed/Changed HEP based on:   [] positioning   [] body mechanics   [] transfers   [] heat/ice application    [] other:      Other Objective/Functional Measures: NA     Pain Level (0-10 scale) post treatment: 0    ASSESSMENT/Changes in Function: Patient responds well to treatment session. Introduced obstacle course to challenge balance while developing self-efficacy. Provided CGA with gait belt for safety. Patient was challenged with exercise prescribed and required cues to recall exercise parameters.  No adverse effects were noted from today's treatment session    Patient will continue to benefit from skilled PT services to modify and progress therapeutic interventions, address functional mobility deficits, address ROM deficits, address strength deficits, analyze and address soft tissue restrictions, analyze and cue movement patterns, analyze and modify body mechanics/ergonomics, assess and modify postural abnormalities, address imbalance and instruct in home and community integration to attain remaining goals. []  See Plan of Care  []  See progress note/recertification  []  See Discharge Summary         Progress towards goals / Updated goals:  Short Term Goals: To be accomplished in 4 weeks:                 DOWPXVJ will report compliance with HEP 1x/day to aid in rehabilitation program.                 Status at IE:Provided initial HEP              Current: Patient states she tries to do \"some\" exercises every day, but she's not sure she's getting through all of them.      8/27/21, in progress     Long Term Goals: To be accomplished in 8 weeks:                 Patient will increase bilateral LE strength to 5/5 MMT throughout to aid in completion of ADLs.                Status at IE:3+/5                 Current: In-progress, 4/5, 8/31/2021                    Patient will report pain no greater than 1-2/10 throughout entire day to aid in completion of ADLs                 Status at IE:2-5/10                 Current: Met 0-2/10 with ADLs, 8/31/2021                    Patent will perform 10 sit<>stands pain-free to demonstrate improvement in status and aid and completion of ADLs.                Status at IE:Requires UEA to perform STS tansfer                 Current: In-progress, Performed 2 x 10 STS from 19 inch seat height with no UEA, 8/25/2021                    Patient will improve FOTO score to 68 points to demonstrate improvement in functional status.                  Status at IE: FOTO score = 43 (an established functional score where 100 = no disability)                 Current: In-progress, 48, 8/18/2021    PLAN  []  Upgrade activities as tolerated     [x]  Continue plan of care  []  Update interventions per flow sheet       []  Discharge due to:_  []  Other:_      Georgina Caro, PT, DPT 9/7/2021  2:46 PM    Future Appointments   Date Time Provider [place]   9/9/2021  2:30 PM Georg Homans, PT [facility]   9/14/2021  2:30 PM Georg Homans, PT [facility]   9/16/2021  2:30 PM Georg Homans, PT [facility]

## 2021-09-09 ENCOUNTER — HOSPITAL ENCOUNTER (OUTPATIENT)
Dept: PHYSICAL THERAPY | Age: 81
Discharge: HOME | End: 2021-09-09
Payer: MEDICARE

## 2021-09-09 PROCEDURE — 97110 THERAPEUTIC EXERCISES: CPT

## 2021-09-09 PROCEDURE — 97112 NEUROMUSCULAR REEDUCATION: CPT

## 2021-09-09 PROCEDURE — 97530 THERAPEUTIC ACTIVITIES: CPT

## 2021-09-09 NOTE — PROGRESS NOTES
PT DAILY TREATMENT NOTE - [facility]     Patient Name: Rafiq Shall  Date:2021  : 1940  [x]  Patient  Verified  Payor: [place] MEDICARE / Plan: [place] MEDICARE PART A & B / Product Type: Medicare /    In time:1430  Out time:1513  Total Treatment Time (min): 43  Total Timed Codes (min): 43   1:1 Treatment Time ( [address] only): 37   Visit #: 13 of 24    Treatment Area: Right hip pain [M25.551]    SUBJECTIVE  Pain Level (0-10 scale): 0  Any medication changes, allergies to medications, adverse drug reactions, diagnosis change, or new procedure performed?: [x] No    [] Yes (see summary sheet for update)  Subjective functional status/changes:   [] No changes reported  Patient rpeorts that she was tired after last visit. She states that she is feeling better today. OBJECTIVE    20 min Therapeutic Exercise:  [x] See flow sheet :   Rationale: increase ROM, increase strength and decrease pain to improve the patients ability to complete ADLs    10 min Therapeutic Activity:  [x]  See flow sheet :   Rationale: increase ROM, increase strength and improve coordination  to improve the patients ability to complete ADLs     13 min Neuromuscular Re-education:  []  See flow sheet :   Rationale: improve coordination, improve balance and increase proprioception  to improve the patients ability to complete ADLs          With   [x] TE   [] TA   [] neuro   [] other: Patient Education: [x] Review HEP    [] Progressed/Changed HEP based on:   [] positioning   [] body mechanics   [] transfers   [] heat/ice application    [] other:      Other Objective/Functional Measures: NA     Pain Level (0-10 scale) post treatment: 0    ASSESSMENT/Changes in Function: Patient responds well to treatment session. Provided close supervision and CGA with balance activities. She was challenged with toe taps during obstacle course as she had a mild LOB. Provided verbal cues during obstacle course to promote safety awareness.  Will continue to focus on balance in future visits. No adverse effects were noted from today's treatment session      Patient will continue to benefit from skilled PT services to modify and progress therapeutic interventions, address functional mobility deficits, address ROM deficits, address strength deficits, analyze and address soft tissue restrictions, analyze and cue movement patterns, analyze and modify body mechanics/ergonomics, assess and modify postural abnormalities, address imbalance and instruct in home and community integration to attain remaining goals. []  See Plan of Care  []  See progress note/recertification  []  See Discharge Summary         Progress towards goals / Updated goals:  Short Term Goals: To be accomplished in 4 weeks:                 QJKFVRD will report compliance with HEP 1x/day to aid in rehabilitation program.                 Status at IE:Provided initial HEP              Current:Met, 9/9/2021     Long Term Goals: To be accomplished in 8 weeks:                 YPBPLTK will increase bilateral LE strength to 5/5 MMT throughout to aid in completion of ADLs.                Status at IE:3+/5                 Current: In-progress, 4/5, 8/31/2021                    Patient will report pain no greater than 1-2/10 throughout entire day to aid in completion of ADLs                 Status at IE:2-5/10                 Current: Met 0-2/10 with ADLs, 8/31/2021                    Patent will perform 10 sit<>stands pain-free to demonstrate improvement in status and aid and completion of ADLs.                Status at IE:Requires UEA to perform STS tansfer                 Current: In-progress, Performed 2 x 10 STS from 19 inch seat height with no UEA, 8/25/2021                    Patient will improve FOTO score to 68 points to demonstrate improvement in functional status.                  Status at IE: FOTO score = 43 (an established functional score where 100 = no disability)                 Current: In-progress, 48, 8/18/2021       PLAN  []  Upgrade activities as tolerated     [x]  Continue plan of care  []  Update interventions per flow sheet       []  Discharge due to:_  []  Other:_      Judie Geiger, PT, DPT 9/9/2021  2:35 PM    Future Appointments   Date Time Provider [place]   9/14/2021  2:30 PM César Ramirez, PT [facility]   9/16/2021  2:30 PM César Ramirez, PT [facility]

## 2021-09-14 ENCOUNTER — HOSPITAL ENCOUNTER (OUTPATIENT)
Dept: PHYSICAL THERAPY | Age: 81
Discharge: HOME | End: 2021-09-14
Payer: MEDICARE

## 2021-09-14 PROCEDURE — 97110 THERAPEUTIC EXERCISES: CPT

## 2021-09-14 PROCEDURE — 97530 THERAPEUTIC ACTIVITIES: CPT

## 2021-09-14 NOTE — PROGRESS NOTES
PT DAILY TREATMENT NOTE - [facility]     Patient Name: Rafiq Shall  Date:2021  : 1940  [x]  Patient  Verified  Payor: Norman Sun / Plan: [place] MEDICARE PART A & B / Product Type: Medicare /    In time:1435  Out time:1515  Total Treatment Time (min): 40  Total Timed Codes (min): 40   1:1 Treatment Time ( [address] only): 40   Visit #: 14 of 24    Treatment Area: Right hip pain [M25.551]    SUBJECTIVE  Pain Level (0-10 scale): 0  Any medication changes, allergies to medications, adverse drug reactions, diagnosis change, or new procedure performed?: [x] No    [] Yes (see summary sheet for update)  Subjective functional status/changes:   [] No changes reported    Patient reports that she has thigh soreness intermittently. OBJECTIVE    30 min Therapeutic Exercise:  [x] See flow sheet :   Rationale: increase ROM, increase strength and decrease pain to improve the patients ability to complete ADLs    10 min Therapeutic Activity:  [x]  See flow sheet :   Rationale: increase ROM, increase strength and improve coordination  to improve the patients ability to complete ADLs           With   [x] TE   [] TA   [] neuro   [] other: Patient Education: [x] Review HEP    [] Progressed/Changed HEP based on:   [] positioning   [] body mechanics   [] transfers   [] heat/ice application    [] other:      Other Objective/Functional Measures: NA     Pain Level (0-10 scale) post treatment: 0    ASSESSMENT/Changes in Function: Patient responds well to treatment session. Patient required cues to recall exercise parameters. She required verbal cues to maintain focus on proper exercise mechanics. Will reassess next visit for potential discharge.  No adverse effects were noted from today's treatment session    Patient will continue to benefit from skilled PT services to modify and progress therapeutic interventions, address functional mobility deficits, address ROM deficits, address strength deficits, analyze and address soft tissue restrictions, analyze and cue movement patterns, analyze and modify body mechanics/ergonomics, assess and modify postural abnormalities, address imbalance and instruct in home and community integration to attain remaining goals. []  See Plan of Care  []  See progress note/recertification  []  See Discharge Summary         Progress towards goals / Updated goals:  Short Term Goals: To be accomplished in 4 weeks:                 LGZVIQA will report compliance with HEP 1x/day to aid in rehabilitation program.                 Status at IE:Provided initial HEP              Current:Met, 9/9/2021     Long Term Goals: To be accomplished in 8 weeks:                 MINUCXJ will increase bilateral LE strength to 5/5 MMT throughout to aid in completion of ADLs.                Status at IE:3+/5                 Current: In-progress, 4/5, 8/31/2021                    Patient will report pain no greater than 1-2/10 throughout entire day to aid in completion of ADLs                 Status at IE:2-5/10                 Current: Met 0-2/10 with ADLs, 8/31/2021                    Patent will perform 10 sit<>stands pain-free to demonstrate improvement in status and aid and completion of ADLs.                Status at IE:Requires UEA to perform STS tansfer                 Current: In-progress, Performed 2 x 10 STS from 19 inch seat height with no UEA, 8/25/2021                    Patient will improve FOTO score to 68 points to demonstrate improvement in functional status.                  Status at IE: FOTO score = 43 (an established functional score where 100 = no disability)                 Current: In-progress, 48, 8/18/2021    PLAN  []  Upgrade activities as tolerated     [x]  Continue plan of care  []  Update interventions per flow sheet       []  Discharge due to:_  []  Other:_      Marleni Ingram, PT, DPT 9/14/2021  2:42 PM    Future Appointments   Date Time Provider [place]   9/16/2021  2:30 PM Yamila Palmer PT [facility]

## 2021-09-16 ENCOUNTER — HOSPITAL ENCOUNTER (OUTPATIENT)
Dept: PHYSICAL THERAPY | Age: 81
Discharge: HOME | End: 2021-09-16
Payer: MEDICARE

## 2021-09-16 PROCEDURE — 97530 THERAPEUTIC ACTIVITIES: CPT

## 2021-09-16 PROCEDURE — 97110 THERAPEUTIC EXERCISES: CPT

## 2021-09-16 NOTE — PROGRESS NOTES
PT DAILY TREATMENT NOTE - [facility]     Patient Name: Alba Falcon  Date:2021  : 1940  [x]  Patient  Verified  Payor: Oneyda Alvarado / Plan: [place] MEDICARE PART A & B / Product Type: Medicare /    In time:1430  Out time:1500  Total Treatment Time (min): 30  Total Timed Codes (min): 30   1:1 Treatment Time ( [address] only): 30   Visit #: 15 of 24    Treatment Area: Right hip pain [M25.551]    SUBJECTIVE  Pain Level (0-10 scale): 0  Any medication changes, allergies to medications, adverse drug reactions, diagnosis change, or new procedure performed?: [x] No    [] Yes (see summary sheet for update)  Subjective functional status/changes:   [] No changes reported  Patient reports that she is able to walk in the community with little to no limitations. OBJECTIVE    20 min Therapeutic Exercise:  [x] See flow sheet :   Rationale: increase ROM, increase strength and decrease pain to improve the patients ability to complete ADLs    10 min Therapeutic Activity:  [x]  See flow sheet :   Rationale: increase ROM, increase strength and improve coordination  to improve the patients ability to complete ADLs           With   [x] TE   [] TA   [] neuro   [] other: Patient Education: [x] Review HEP    [] Progressed/Changed HEP based on:   [] positioning   [] body mechanics   [] transfers   [] heat/ice application    [] other:      Other Objective/Functional Measures:   FOTO 91   5/5 with exception of hip adduction and abduction which are globally 4+/5  2 x 10 STS from 18 inch seat height with no UEA and 0/10 pain    Pain Level (0-10 scale) post treatment: 0    ASSESSMENT/Changes in Function: Patient responds well to treatment session. No adverse effects were noted from today's treatment session. Patient is being discharged as she has met or partially met 100% of her short and long term goals.  She has gained strength and functional mobility resulting in increased tolerance to community ambulation while reducing risk for falls. She is compliant with HEP and plans to reduce future episodes of care with continued participation in an independent exercise program. Provided Hep to promote seamless transition to independent exercise. []  See Plan of Care  []  See progress note/recertification  [x]  See Discharge Summary         Progress towards goals / Updated goals:  Short Term Goals: To be accomplished in 4 weeks:                 BNNZYFS will report compliance with HEP 1x/day to aid in rehabilitation program.                 Status at IE:Provided initial HEP              Current:Met, 9/9/2021     Long Term Goals: To be accomplished in 8 weeks:                 ZEZRELI will increase bilateral LE strength to 5/5 MMT throughout to aid in completion of ADLs.                Status at IE:3+/5                 Current:  Partially met 5/5 with exception of hip adduction and abduction which are globally 4+/5, 9/16/2021                    Patient will report pain no greater than 1-2/10 throughout entire day to aid in completion of ADLs                 Status at IE:2-5/10                 Current: Met 0-2/10 with ADLs, 8/31/2021                    Patent will perform 10 sit<>stands pain-free to demonstrate improvement in status and aid and completion of ADLs.                Status at IE:Requires UEA to perform STS tansfer                 Current: Met, Performed 2 x 10 STS from 18 inch seat height with no UEA, 9/16/2021                    Patient will improve FOTO score to 68 points to demonstrate improvement in functional status.                Status at IE: FOTO score = 43 (an established functional score where 100 = no disability)                 Current: Met 91, 9/16/2021    PLAN  []  Upgrade activities as tolerated     []  Continue plan of care  []  Update interventions per flow sheet       [x]  Discharge due to: Goals Met  []  Other:_      Nikolai Drake, PT, DPT 9/16/2021  2:34 PM    No future appointments.

## 2021-09-16 NOTE — PROGRESS NOTES
[facility] at [address] [place] [address] Drive: [phone]   Fax: [phone]  Discharge Summary  Patient Name: Samia Ching : 1940   Medical   Diagnosis: Right hip pain [M25.551] Treatment Diagnosis: Right hip pain   Onset Date: 2021     Referral Source: Jadon James MD [facility] [place]): 8/3/2021   Prior Hospitalization: See medical history Provider #: [id number]   Prior Level of Function: exercise, gardening, yard work   Comorbidities: thyroid   Medications: Verified on Patient Summary List      ===========================================================================================  Assessment / Summary of Care: Samia Ching is a [de-identified] y.o. 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 Patient is being discharged as she has met or partially met 100% of her short and long term goals. She has gained strength and functional mobility resulting in increased tolerance to community ambulation while reducing risk for falls. She is compliant with HEP and plans to reduce future episodes of care with continued participation in an independent exercise program. Provided Hep to promote seamless transition to independent exercise.   ===========================================================================================    Plan: Discharge to independent HEP. Goals:   Short Term Goals: To be accomplished in 4 weeks:                 GNRAXZD will report compliance with HEP 1x/day to aid in rehabilitation program.                 Status at :Provided initial HEP              Current:Met, 2021     Long Term Goals: To be accomplished in 8 weeks:                 VBZREHJ will increase bilateral LE strength to 5/5 MMT throughout to aid in completion of ADLs.                  Status at IE:3+/5                 Current:  Partially met 5/5 with exception of hip adduction and abduction which are globally 4+/5, 9/16/2021                    Patient will report pain no greater than 1-2/10 throughout entire day to aid in completion of ADLs                 Status at IE:2-5/10                 Current: Met 0-2/10 with ADLs, 8/31/2021                    Patent will perform 10 sit<>stands pain-free to demonstrate improvement in status and aid and completion of ADLs.                Status at IE:Requires UEA to perform STS tansfer                 Current: Met, Performed 2 x 10 STS from 18 inch seat height with no UEA, 9/16/2021                    Patient will improve FOTO score to 68 points to demonstrate improvement in functional status.                Status at IE: FOTO score = 43 (an established functional score where 100 = no disability)                 Current: Met 91, 9/16/2021    ===========================================================================================  Subjective: Patient reports that she is able to walk in the community with little to no limitations.      Objective:   FOTO 91           5/5 with exception of hip adduction and abduction which are globally 4+/5  2 x 10 STS from 18 inch seat height with no UEA and 0/10 pain    Therapist Signature: Lexie Cook PT, DPT Date: 9/16/2021     Time: 3:13 PM

## 2022-06-13 ENCOUNTER — HOSPITAL ENCOUNTER (OUTPATIENT)
Dept: PHYSICAL THERAPY | Age: 82
Discharge: HOME | End: 2022-06-13
Payer: MEDICARE

## 2022-06-13 PROCEDURE — 97110 THERAPEUTIC EXERCISES: CPT

## 2022-06-13 PROCEDURE — 97161 PT EVAL LOW COMPLEX 20 MIN: CPT

## 2022-06-13 NOTE — PROGRESS NOTES
PT DAILY TREATMENT NOTE/LUMBAR EVAL 10-18    Patient Name: Juliette Marshall  Date:2022  : 1940  [x]  Patient  Verified  Payor: [place] MEDICARE / Plan: VA MEDICARE PART A & B / Product Type: Medicare /    In time:1345  Out time:1440  Total Treatment Time (min): 55  Visit #: 1 of 16    Medicare/BCBS Only   Total Timed Codes (min):  23 1:1 Treatment Time:  55     Treatment Area: Other low back pain [M54.59]  Pain in left leg [M79.605]  Pain in right leg [M79.604]  SUBJECTIVE  Pain Level (0-10 scale): 2-3  [x]constant []intermittent []improving []worsening []no change since onset    Any medication changes, allergies to medications, adverse drug reactions, diagnosis change, or new procedure performed?: [x] No    [] Yes (see summary sheet for update)  Subjective functional status/changes:     Patient presents with c/o low back pain numbness in bilateral feet since 2021 when she fell and broke her right hip. Patient describes pain as ache, stiff. Report numbness/tingling in bilateral feet. Denies popping/clicking. Aggravating factors:house work, bending forward, standing straight. Alleviating factors: ice. Denies red flags: SOB, chest pain, dizziness/lightheadedness, blurred/double vision, HA, chills/fevers, night sweats, change in bowel/bladder control, abdominal pain, difficulty swallowing, slurred speech, unexplained weight gain/loss, nausea, vomiting. PMHx: thyroidism. Surgical Hx: colectomy, right hip replacement , appendectomy, cataract Social Hx: Lives with  in a single story home with 4 steps to enter, work status: retired. PLOF: exercise, gardening, yard work. CLOF: limited with weight bearing.  Diagnostic Imaging: reports MRI and xray but does not have reports    OBJECTIVE/EXAMINATION    32 min [x]Eval                  []Re-Eval     23 min Therapeutic Exercise:  [x] See flow sheet :   Rationale: increase ROM, increase strength and decrease pain to improve the patients ability to complete ADLs      With   [x] TE   [] TA   [] neuro   [] other: Patient Education: [x] Review HEP    [] Progressed/Changed HEP based on:   [] positioning   [] body mechanics   [] transfers   [] heat/ice application    [] other:      Physical Therapy Evaluation - Lumbar Spine    OBJECTIVE  Posture:  Lateral Shift: [] R    [x] L     [x] +  [] -    Gait:  [] Normal     [x] Abnormal: Patient ambulates with independence demonstrating an antalgic gait pattern.      Active Movements: [] N/A   [] Too acute   [] Other:  ROM % AROM Comments:pain, area   Forward flexion 40-60 75 pain   Extension 20-30 50 pain   SB right 20-30 50 pain   SB left 20-30 100    Rotation right 5-10 100    Rotation left 5-10 100      Repeated Movements   Effects on present pain: produces (KS), abolishes (A), increases (incr), decreases (decr), centralizes (C), peripheral (PH), no effect (NE)   Pre-Test Sx Flexion Repeated Flexion Extension Repeated Extension Repeated SBL Repeated SBR   Sitting   NE       Standing  NE  NE NE NE NE   Lying      N/A N/A   Comments:  Side Glide:  Sustained passive positioning test:    Neuro Screen [x] WNL    Dural Mobility:  SLR Supine: [x] R    [] L    [x] +    [] -    Slump Test: [x] R    [x] L    [] +    [x] -      Palpation  [] Min  [] Mod  [x] Severe    Location: right PSIS    Strength   L(0-5) R (0-5) N/T   Hip Flexion (L1,2) 4 3+ []   Knee Extension (L3,4) 4 4- []   Ankle Dorsiflexion (L4) 4 4- []   Great Toe Extension (L5) 4 4- []   Ankle Plantarflexion (S1) 3+ 3+ []   Knee Flexion (S1,2) 4 4- []   Abdominals 4- 4- []   Paraspinals 4- 4- []   Back Rotators 4- 4- []   Gluteus Loy 3+ 4- []   Hip Abduction 4- 4- []       Special Tests    Sacroilliac:  Gaenslen's: [] R    [] L    [] +    [x] -     Compression: [] +    [x] -     Gapping:  [] +    [x] -     Thigh Thrust: [] R    [] L    [] +    [x] -          Hip: Madi:  [] R    [] L    [] +    [x] -     Scour:  [] R    [] L    [] +    [x] -     Piriformis: [] R [] L    [] +    [x] -     Other tests/comments:  30 second sit to stand: 11     Pain Level (0-10 scale) post treatment: 2    ASSESSMENT/Changes in Function:   Patient presents with c/o low back pain numbness in bilateral feet since July 4th, 2021 when she fell and broke her right hip. 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 She has reduced postural strength and awareness. She demonstrates left lateral shift secondary to scoliosis. She has reduced lumbar ROM. She demonstrates bilateral LE weakness right more than left. Patient presentation is consistent L5-S1 discogenic pain. Patient will benefit from skilled PT services to modify and progress therapeutic interventions, address functional mobility deficits, address ROM deficits, address strength deficits, analyze and address soft tissue restrictions, analyze and cue movement patterns, analyze and modify body mechanics/ergonomics and assess and modify postural abnormalities to attain her goals.      [x]  See Plan of Care  []  See progress note/recertification  []  See Discharge Summary         Progress towards goals / Updated goals:  See POC    PLAN  []  Upgrade activities as tolerated     [x]  Continue plan of care  []  Update interventions per flow sheet       []  Discharge due to:_  []  Other:_      Trinidad Menjivar, PT, DPT 6/13/2022  1:39 PM

## 2022-06-13 NOTE — PROGRESS NOTES
[facility] at [address]: [phone]   Fax: [phone]  PLAN OF CARE / [address] [place] [address] FOR PHYSICAL THERAPY SERVICES  Patient Name: Marylou Meredith : 1940   Medical   Diagnosis: Other low back pain [M54.59]  Pain in right hip [M25.551] Treatment Diagnosis: Low back pain   Right hip pain   Onset Date: 2021     Referral Source: Alma Bryan MD Start of [facility]): 2022   Prior Hospitalization: See medical history Provider #: [id number]   Prior Level of Function: exercise, gardening, yard work   Comorbidities: Thyroidism, osteoporosis   Medications: Verified on Patient Summary List   The Plan of Care and following information is based on the information from the initial evaluation.   ===========================================================================================  Assessment / key information:  Marylou Meredith is a 81 y.o. female presents with c/o low back pain numbness in bilateral feet since 2021 when she fell and broke her right hip. 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 She has reduced postural strength and awareness. She demonstrates left lateral shift secondary to scoliosis. She has reduced lumbar ROM. She demonstrates bilateral LE weakness right more than left. Patient presentation is consistent L5-S1 discogenic pain.  Patient will benefit from skilled PT services to modify and progress therapeutic interventions, address functional mobility deficits, address ROM deficits, address strength deficits, analyze and address soft tissue restrictions, analyze and cue movement patterns, analyze and modify body mechanics/ergonomics and assess and modify postural abnormalities to attain her goals.    ===========================================================================================  Eval Complexity: History MEDIUM  Complexity : 1-2 comorbidities / personal factors will impact the outcome/ POC ;  Examination  LOW Complexity : 1-2 Standardized tests and measures addressing body structure, function, activity limitation and / or participation in recreation ; Presentation LOW Complexity : Stable, uncomplicated ;  Decision Making MEDIUM Complexity : FOTO score of 26-74; Overall Complexity LOW   Problem List: pain affecting function, decrease ROM, decrease strength, impaired gait/ balance, decrease ADL/ functional abilitiies, decrease activity tolerance, decrease flexibility/ joint mobility and decrease transfer abilities   Treatment Plan may include any combination of the following: Therapeutic exercise, Therapeutic activities, Neuromuscular re-education, Physical agent/modality, Gait/balance training, Manual therapy, Aquatic therapy, Patient education, Self Care training, Functional mobility training, Home safety training and Stair training  Patient / Family readiness to learn indicated by: asking questions, trying to perform skills and interest  Persons(s) to be included in education: patient (P)  Barriers to Learning/Limitations: no  Measures taken if barriers to learning:   Patient Goal (s): \"freedom from pain\"   Patient self reported health status: good  Rehabilitation Potential: good  Goals:  Short Term Goals: To be accomplished in 4 weeks:   Patient will report compliance with HEP 1x/day to aid in rehabilitation program.   Status at IE: Will provide HEP   Current:Same as IE     Long Term Goals: To be accomplished in 8 weeks:   Patient will increase bilateral LE strength to 4+/5 MMT throughout to aid in completion of ADLs. Status at IE:4-/5   Current:Same as IE     Patient will report pain no greater than 1-2/10 throughout entire day to aid in completion of ADLs. Status at IE:2-9/10   Current:Same as IE     Patent will be able to sit/stand for 30mins to be able to prepare meals, drive to appointments and complete ADLs.    Status at [address] [place] [address] with prolonged positioning   Current:Same as IE     Patient will improve FOTO score to 58 points to demonstrate improvement in functional status. Status at IE:FOTO score = 47 (an established functional score where 100 = no disability)   Current:Same as IE    Frequency / Duration:   Patient to be seen 2  times per week for 12  weeks:  Patient / Caregiver education and instruction: self care and exercises       Therapist Signature: Shahbaz Bhat PT, DPT Date: 3/76/7986   Certification Period: 6/13/2022 - 9/11/2022 Time: 2:59 PM   ===========================================================================================  I certify that the above Physical Therapy Services are being furnished while the patient is under my care. I agree with the treatment plan and certify that this therapy is necessary. Physician Signature:        Date:       Time:     Please sign and return to [facility] or you may fax the signed copy to [phone]. Thank you.

## 2022-06-16 ENCOUNTER — HOSPITAL ENCOUNTER (OUTPATIENT)
Dept: PHYSICAL THERAPY | Age: 82
Discharge: HOME | End: 2022-06-16
Payer: MEDICARE

## 2022-06-16 PROCEDURE — 97110 THERAPEUTIC EXERCISES: CPT

## 2022-06-16 PROCEDURE — 97530 THERAPEUTIC ACTIVITIES: CPT

## 2022-06-16 NOTE — PROGRESS NOTES
PT DAILY TREATMENT NOTE - [facility]     Patient Name: Johnson Lee  Date:2022  : 1940  [x]  Patient  Verified  Payor: Sang Peaks / Plan: VA MEDICARE PART A & B / Product Type: Medicare /    In time:1515  Out time:1555  Total Treatment Time (min): 40  Total Timed Codes (min): 40   1:1 Treatment Time ( [address] only): 40   Visit #: 2 of 16    Treatment Area: Other low back pain [M54.59]  Pain in right hip [M25.551]    SUBJECTIVE  Pain Level (0-10 scale): 2-3  Any medication changes, allergies to medications, adverse drug reactions, diagnosis change, or new procedure performed?: [x] No    [] Yes (see summary sheet for update)  Subjective functional status/changes:   [] No changes reported    Patient reports no new changed since initial visit. OBJECTIVE    30 min Therapeutic Exercise:  [x] See flow sheet :   Rationale: increase ROM, increase strength and decrease pain to improve the patients ability to complete ADLs    10 min Therapeutic Activity:  [x]  See flow sheet :   Rationale: increase ROM, increase strength and improve coordination  to improve the patients ability to complete ADLs        With   [x] TE   [] TA   [] neuro   [] other: Patient Education: [x] Review HEP    [] Progressed/Changed HEP based on:   [] positioning   [] body mechanics   [] transfers   [] heat/ice application    [] other:      Other Objective/Functional Measures: NA     Pain Level (0-10 scale) post treatment: 1-2    ASSESSMENT/Changes in Function: Patient responds well to treatment session. Patient required cues to recall exercise parameters. Provided cues sit to stand to reduce genu valgum.  No adverse effects were noted from today's treatment session      Patient will continue to benefit from skilled PT services to modify and progress therapeutic interventions, address functional mobility deficits, address ROM deficits, address strength deficits, analyze and address soft tissue restrictions, analyze and cue movement patterns, analyze and modify body mechanics/ergonomics, assess and modify postural abnormalities, address imbalance and instruct in home and community integration to attain remaining goals. []  See Plan of Care  []  See progress note/recertification  []  See Discharge Summary         Progress towards goals / Updated goals:  Short Term Goals: To be accomplished in 4 weeks:                 Patient will report compliance with HEP 1x/day to aid in rehabilitation program.                 Status at IE: Will provide HEP                 Current: In-progress, reviewed safe body mechanics, 6/16/2022     Long Term Goals: To be accomplished in 8 weeks:                 Patient will increase bilateral LE strength to 4+/5 MMT throughout to aid in completion of ADLs. Status at IE:4-/5                 Current:Same as IE                    Patient will report pain no greater than 1-2/10 throughout entire day to aid in completion of ADLs. Status at IE:2-9/10                 Current:Same as IE                    Patent will be able to sit/stand for 30mins to be able to prepare meals, drive to appointments and complete ADLs. Status at [address] [place] [address] with prolonged positioning                 Current:Same as IE                    Patient will improve FOTO score to 58 points to demonstrate improvement in functional status.                  Status at IE:FOTO score = 47 (an established functional score where 100 = no disability)                 Current:Same as IE    PLAN  []  Upgrade activities as tolerated     [x]  Continue plan of care  []  Update interventions per flow sheet       []  Discharge due to:_  []  Other:_      Ekta Orantes PT, DPT 6/16/2022  3:53 PM    Future Appointments   Date Time Provider [place]   6/20/2022  4:45 PM Gatha Sever, PT [facility]   6/22/2022  3:15 PM Gatha Sever, PT [facility]   6/28/2022  1:00 PM Gatha Sever, PT [facility] 6/30/2022  4:45 PM Guillermina Smith PT [facility]

## 2022-06-20 ENCOUNTER — HOSPITAL ENCOUNTER (OUTPATIENT)
Dept: PHYSICAL THERAPY | Age: 82
Discharge: HOME | End: 2022-06-20
Payer: MEDICARE

## 2022-06-20 PROCEDURE — 97110 THERAPEUTIC EXERCISES: CPT

## 2022-06-20 PROCEDURE — 97530 THERAPEUTIC ACTIVITIES: CPT

## 2022-06-20 NOTE — PROGRESS NOTES
PT DAILY TREATMENT NOTE - [facility]     Patient Name: Rich Certain  Date:2022  : 1940  [x]  Patient  Verified  Payor: VA MEDICARE / Plan: VA MEDICARE PART A & B / Product Type: Medicare /    In time:  Out time:173  Total Treatment Time (min): 45  Total Timed Codes (min): 45   1:1 Treatment Time ( [address] only): 45   Visit #: 3 of 16    Treatment Area: Other low back pain [M54.59]  Pain in right hip [M25.551]    SUBJECTIVE  Pain Level (0-10 scale): 1  Any medication changes, allergies to medications, adverse drug reactions, diagnosis change, or new procedure performed?: [x] No    [] Yes (see summary sheet for update)  Subjective functional status/changes:   [] No changes reported    Patient reports less intense low back pain but it still limited. OBJECTIVE    25 min Therapeutic Exercise:  [x] See flow sheet :   Rationale: increase ROM, increase strength and decrease pain to improve the patients ability to complete ADLs    20 min Therapeutic Activity:  [x]  See flow sheet :   Rationale: increase ROM, increase strength and improve coordination  to improve the patients ability to complete ADLs           With   [x] TE   [] TA   [] neuro   [] other: Patient Education: [x] Review HEP    [] Progressed/Changed HEP based on:   [] positioning   [] body mechanics   [] transfers   [] heat/ice application    [] other:      Other Objective/Functional Measures: NA     Pain Level (0-10 scale) post treatment: 1    ASSESSMENT/Changes in Function: Patient responds well to treatment session. Patient demonstrated improved activity tolerance as she reported less pain at the start of care. Introduced lumbar lower trunk rotation with opposite arm rotation and she reported less pain following the activity. She had less pain with STS with hip abduction and less pain with bridges with hip adduction. Will continue to focus on hip strength in future visits.  No adverse effects were noted from today's treatment session    Patient will continue to benefit from skilled PT services to modify and progress therapeutic interventions, address functional mobility deficits, address ROM deficits, address strength deficits, analyze and address soft tissue restrictions, analyze and cue movement patterns, analyze and modify body mechanics/ergonomics, assess and modify postural abnormalities, address imbalance and instruct in home and community integration to attain remaining goals. []  See Plan of Care  []  See progress note/recertification  []  See Discharge Summary         Progress towards goals / Updated goals:  Short Term Goals: To be accomplished in 4 weeks:                 MGAGIE will report compliance with HEP 1x/day to aid in rehabilitation program.                 Status at IE: Will provide HEP                 EPAKOCM: In-progress, reviewed safe body mechanics, 6/16/2022     Long Term Goals: To be accomplished in 8 weeks:                 UQUTMBH will increase bilateral LE strength to 4+/5 MMT throughout to aid in completion of ADLs.                Status at IE:4-/5                 Current:Same as IE                    Patient will report pain no greater than 1-2/10 throughout entire day to aid in completion of ADLs.                Status at IE:2-9/10                 Current: In-progress, 2-3/10, 6/20/2022E                    Patent will be able to sit/stand for 30mins to be able to prepare meals, drive to appointments and complete ADLs.                Status at [address] [place] [address] with prolonged positioning                 Current:Same as IE                    AEVAPSH will improve FOTO score to 58 points to demonstrate improvement in functional status.                  Status at IE:FOTO score = 47 (an established functional score where 100 = no disability)                 Current:Same as IE    PLAN  []  Upgrade activities as tolerated     [x]  Continue plan of care  []  Update interventions per flow sheet       []  Discharge due to:_  []  Other:_      Shu Brooks, PT, DPT 6/20/2022  5:00 PM    Future Appointments   Date Time Provider [place]   6/22/2022  3:15 PM DORI Hernandez [facility]   6/28/2022  1:00 PM DORI Hernandez [facility]   6/30/2022  4:45 PM DORI Hernandez [facility]

## 2022-06-22 ENCOUNTER — HOSPITAL ENCOUNTER (OUTPATIENT)
Dept: PHYSICAL THERAPY | Age: 82
Discharge: HOME | End: 2022-06-22
Payer: MEDICARE

## 2022-06-22 PROCEDURE — 97110 THERAPEUTIC EXERCISES: CPT

## 2022-06-22 PROCEDURE — 97530 THERAPEUTIC ACTIVITIES: CPT

## 2022-06-22 NOTE — PROGRESS NOTES
PT DAILY TREATMENT NOTE - [facility]     Patient Name: Jenniffer Stack  Date:2022  : 1940  [x]  Patient  Verified  Payor: Lakia Stuart / Plan: VA MEDICARE PART A & B / Product Type: Medicare /    In time:1520  Out time:1600  Total Treatment Time (min): 40  Total Timed Codes (min): 40   1:1 Treatment Time ([facility] only): 40   Visit #: 4 of 16    Treatment Area: Other low back pain [M54.59]  Pain in right hip [M25.551]    SUBJECTIVE  Pain Level (0-10 scale): 0  Any medication changes, allergies to medications, adverse drug reactions, diagnosis change, or new procedure performed?: [x] No    [] Yes (see summary sheet for update)  Subjective functional status/changes:   [] No changes reported    Patient reports less frequent low back pain. She reports that she has less back pain when walking with SPC. OBJECTIVE    30 min Therapeutic Exercise:  [x] See flow sheet :   Rationale: increase ROM, increase strength and decrease pain to improve the patients ability to complete ADLs    10 min Therapeutic Activity:  [x]  See flow sheet :   Rationale: increase ROM, increase strength and improve coordination  to improve the patients ability to complete ADLs        With   [x] TE   [] TA   [] neuro   [] other: Patient Education: [x] Review HEP    [] Progressed/Changed HEP based on:   [] positioning   [] body mechanics   [] transfers   [] heat/ice application    [] other:      Other Objective/Functional Measures: NA     Pain Level (0-10 scale) post treatment: 0    ASSESSMENT/Changes in Function: Patient responds well to treatment session. Advanced exercise as patient demonstrated improved weight bearing tolerance. Patient was challenged with 3 way hip strengthening exercise. Provided cues for activity pacing. Will continues to advance exercise as tolerated.  No adverse effects were noted from today's treatment session    Patient will continue to benefit from skilled PT services to modify and progress therapeutic interventions, address functional mobility deficits, address ROM deficits, address strength deficits, analyze and address soft tissue restrictions, analyze and cue movement patterns, analyze and modify body mechanics/ergonomics, assess and modify postural abnormalities, address imbalance and instruct in home and community integration to attain remaining goals. []  See Plan of Care  []  See progress note/recertification  []  See Discharge Summary         Progress towards goals / Updated goals:  Short Term Goals: To be accomplished in 4 weeks:                 PTNWCEF will report compliance with HEP 1x/day to aid in rehabilitation program.                 Status at IE: Will provide HEP                 Current: In-progress, reviewed safe body mechanics, 6/16/2022     Long Term Goals: To be accomplished in 8 weeks:                 Patient will increase bilateral LE strength to 4+/5 MMT throughout to aid in completion of ADLs.                Status at IE:4-/5                 Current:Same as IE                    Patient will report pain no greater than 1-2/10 throughout entire day to aid in completion of ADLs.                Status at IE:2-9/10                 Current: In-progress, 2-3/10, 6/20/2022                    Patent will be able to sit/stand for 30mins to be able to prepare meals, drive to appointments and complete ADLs.                Status at [address] [place] [address] with prolonged positioning                 Current:Same as IE                    AFCSYLO will improve FOTO score to 58 points to demonstrate improvement in functional status.                  Status at IE:FOTO score = 47 (an established functional score where 100 = no disability)                 Current:Same as IE    PLAN  []  Upgrade activities as tolerated     [x]  Continue plan of care  []  Update interventions per flow sheet       []  Discharge due to:_  []  Other:_      Corazon Shanks, PT, DPT 6/22/2022  3:45 PM    Future Appointments   Date Time Provider [place]   6/28/2022  1:00 PM DORI Frias [facility]   6/30/2022  4:45 PM DORI Frias [facility]

## 2022-06-28 ENCOUNTER — HOSPITAL ENCOUNTER (OUTPATIENT)
Dept: PHYSICAL THERAPY | Age: 82
Discharge: HOME | End: 2022-06-28
Payer: MEDICARE

## 2022-06-28 PROCEDURE — 97112 NEUROMUSCULAR REEDUCATION: CPT

## 2022-06-28 PROCEDURE — 97530 THERAPEUTIC ACTIVITIES: CPT

## 2022-06-28 PROCEDURE — 97110 THERAPEUTIC EXERCISES: CPT

## 2022-06-28 NOTE — PROGRESS NOTES
PT DAILY TREATMENT NOTE - [facility]     Patient Name: Dominick Maciel  Date:2022  : 1940  [x]  Patient  Verified  Payor: Lokesh Vital / Plan: [place] MED[place] PART A & B / Product Type: Medicare /    In time:1305  Out time:1345  Total Treatment Time (min): 40  Total Timed Codes (min): 40   1:1 Treatment Time ( [address] only): 40   Visit #: 5 of 16    Treatment Area: Other low back pain [M54.59]  Pain in right hip [M25.551]    SUBJECTIVE  Pain Level (0-10 scale): 0  Any medication changes, allergies to medications, adverse drug reactions, diagnosis change, or new procedure performed?: [x] No    [] Yes (see summary sheet for update)  Subjective functional status/changes:   [] No changes reported    Patient reports less pain with weight bearing activities. She reports that she is compliant with initial HEP. OBJECTIVE    20 min Therapeutic Exercise:  [x] See flow sheet :   Rationale: increase ROM, increase strength and decrease pain to improve the patients ability to complete ADLs    10 min Therapeutic Activity:  [x]  See flow sheet :   Rationale: increase ROM, increase strength and improve coordination  to improve the patients ability to complete ADLs     10 min Neuromuscular Re-education:  [x]  See flow sheet :   Rationale: improve coordination, improve balance and increase proprioception  to improve the patients ability to complete ADLs          With   [x] TE   [] TA   [] neuro   [] other: Patient Education: [x] Review HEP    [] Progressed/Changed HEP based on:   [] positioning   [] body mechanics   [] transfers   [] heat/ice application    [] other:      Other Objective/Functional Measures: NA     Pain Level (0-10 scale) post treatment: 0    ASSESSMENT/Changes in Function: Patient responds well to treatment session. Patient demonstrated improved activity tolerance a she required less frequent muscle recovery time. She required cues to recall exercise parameters.  Introduced balance and encouraged patient to reduce UEA. Provided close supervision for safety. No adverse effects were noted from today's treatment session      Patient will continue to benefit from skilled PT services to modify and progress therapeutic interventions, address functional mobility deficits, address ROM deficits, address strength deficits, analyze and address soft tissue restrictions, analyze and cue movement patterns, analyze and modify body mechanics/ergonomics, assess and modify postural abnormalities, address imbalance and instruct in home and community integration to attain remaining goals. []  See Plan of Care  []  See progress note/recertification  []  See Discharge Summary         Progress towards goals / Updated goals:  Short Term Goals: To be accomplished in 4 weeks:                 [id number] will report compliance with HEP 1x/day to aid in rehabilitation program.                 Status at IE: Will provide HEP                 Current: In-progress, reviewed reviewed HEP and provided handout, 6/28/2022    Long Term Goals: To be accomplished in 8 weeks:                 FUMMYCV will increase bilateral LE strength to 4+/5 MMT throughout to aid in completion of ADLs.                Status at IE:4-/5                 Current:Same as IE                    Patient will report pain no greater than 1-2/10 throughout entire day to aid in completion of ADLs.                Status at IE:2-9/10                 Current: In-progress, 2-3/10, 6/20/2022                    Patent will be able to sit/stand for 30mins to be able to prepare meals, drive to appointments and complete ADLs.                Status at [address] [place] [address] with prolonged positioning                 Current:Same as IE                    YGEEZZQ will improve FOTO score to 58 points to demonstrate improvement in functional status.                  Status at IE:FOTO score = 47 (an established functional score where 100 = no disability)                 Current:Same as IE    PLAN  []  Upgrade activities as tolerated     [x]  Continue plan of care  []  Update interventions per flow sheet       []  Discharge due to:_  []  Other:_      Livia Victor, PT, DPT 6/28/2022  1:04 PM    Future Appointments   Date Time Provider [place]   6/30/2022  4:45 PM Rigo Wood, PT [facility]

## 2022-06-30 ENCOUNTER — APPOINTMENT (OUTPATIENT)
Dept: PHYSICAL THERAPY | Age: 82
End: 2022-06-30
Payer: MEDICARE

## 2022-07-12 ENCOUNTER — APPOINTMENT (OUTPATIENT)
Dept: PHYSICAL THERAPY | Age: 82
End: 2022-07-12
Payer: MEDICARE

## 2022-07-14 ENCOUNTER — APPOINTMENT (OUTPATIENT)
Dept: PHYSICAL THERAPY | Age: 82
End: 2022-07-14
Payer: MEDICARE

## 2022-07-18 ENCOUNTER — HOSPITAL ENCOUNTER (OUTPATIENT)
Dept: PHYSICAL THERAPY | Age: 82
Discharge: HOME | End: 2022-07-18
Payer: MEDICARE

## 2022-07-18 PROCEDURE — 97112 NEUROMUSCULAR REEDUCATION: CPT

## 2022-07-18 PROCEDURE — 97110 THERAPEUTIC EXERCISES: CPT

## 2022-07-18 PROCEDURE — 97530 THERAPEUTIC ACTIVITIES: CPT

## 2022-07-18 NOTE — PROGRESS NOTES
PT DAILY TREATMENT NOTE    Patient Name: Dominick Maciel  Date:2022  : 1940  [x]  Patient  Verified  Payor: Lokesh Vital / Plan: [place] MEDICARE PART A & B / Product Type: Medicare /    In time: 338  Out time: 363  Total Treatment Time (min): 53  Total Timed Codes (min): 53  1:1 Treatment Time ( [address] only): 40   Visit #: 6 of 16    Treatment Dx: Other low back pain [M54.59]  Pain in right hip [M25.551]    SUBJECTIVE  Pain Level (0-10 scale): 1  Any medication changes, allergies to medications, adverse drug reactions, diagnosis change, or new procedure performed?: [x] No    [] Yes (see summary sheet for update)  Subjective functional status/changes:   [] No changes reported  \"I was out of town the past two weeks, but I have made sure to do my home exercise program every day. I have a little bit of back pain, but not bad. \"    OBJECTIVE    15 min Therapeutic Exercise:  [x] See flow sheet :   Rationale: increase ROM, increase strength and decrease pain to improve the patients ability to complete ADLs  ambulation safety and efficiency in order to improve patient's ability to safely ambulate at home for self care.         15 min Therapeutic Activity:  [x]  See flow sheet :   Rationale: increase ROM, increase strength and improve coordination  to improve the patients ability to Complete ADLS     10 min Neuromuscular Re-education:  [x]  See flow sheet :   Rationale: improve coordination, improve balance and increase proprioception  to improve the patients ability to complete ADLS, and decrease falls risk    With   [] TE   [] TA   [] neuro   [] other: Patient Education: [x] Review HEP    [] Progressed/Changed HEP based on:   [] positioning   [] body mechanics   [] transfers   [] heat/ice application    [] other:      Other Objective/Functional Measures: NA     Pain Level (0-10 scale) post treatment: 0    ASSESSMENT/Changes in Function: Patient has been diligent and consistent with exercises as instructed and is noting reduced pain intensity and improving prolonged sitting and standing tolerance as a result. Bilateral LE strength and tolerance for household ADLs are also improving. Patient responds well to treatment session. No adverse effects were noted from today's treatment session. Patient will continue to benefit from skilled PT services to modify and progress therapeutic interventions, address functional mobility deficits, address ROM deficits, address strength deficits, analyze and address soft tissue restrictions, analyze and cue movement patterns, analyze and modify body mechanics/ergonomics, assess and modify postural abnormalities,  and instruct in home and community integration to attain remaining goals. []  See Plan of Care  [x]  See progress note/recertification  []  See Discharge Summary         Progress towards goals / Updated goals:  Short Term Goals: To be accomplished in 4 weeks:                 HNZWULQ will report compliance with HEP 1x/day to aid in rehabilitation program.                 Status at IE: Will provide HEP                 Current: In-progress, reviewed reviewed HEP and provided handout, 6/28/2022     Long Term Goals: To be accomplished in 8 weeks:                 Patient will increase bilateral LE strength to 4+/5 MMT throughout to aid in completion of ADLs.                Status at IE:4-/5                 Current: Improved to 4/5.     7/18/2022, in progress                    Patient will report pain no greater than 1-2/10 throughout entire day to aid in completion of ADLs.                Status at IE:2-9/10                 Current: In-progress, 2-3/10, 6/20/2022                    Patent will be able to sit/stand for 30 mins to be able to prepare meals, drive to appointments and complete ADLs.                  Status at [address] [place] [address] with prolonged positioning                 YZQPXLC: Patient reports reduced pain with preparing meals and driving.        7/56/4978, in progress                    Patient will improve FOTO score to 58 points to demonstrate improvement in functional status.                  Status at IE:FOTO score = 47 (an established functional score where 100 = no disability)                 Current: 70       7/18/2022, MET      PLAN  []  Upgrade activities as tolerated     [x]  Continue plan of care  []  Update interventions per flow sheet       []  Discharge due to:_  []  Other:_      Chata Agent, PT 7/18/2022  1:08 PM    Future Appointments   Date Time Provider [place]   7/20/2022  2:30 PM zAra Purdy, PT [facility]

## 2022-07-18 NOTE — PROGRESS NOTES
[facility] at [address] [place] [address] Drive: [phone]   Fax: [phone]  Progress Note  Patient Name: Aaron Gillis : 1940   Medical   Diagnosis: Other low back pain [M54.59]  Pain in right hip [M25.551] Treatment Diagnosis: Low back pain   Right hip pain   Onset Date: 2021       Referral Source: Oli Peacock MD Start of Care [facility] [place]): 2022   Prior Hospitalization: See medical history Provider #: [id number]   Prior Level of Function: exercise, gardening, yard work   Comorbidities: Thyroidism, osteoporosis   Medications: Verified on Patient Summary List      ===========================================================================================  Assessment / Summary of Care: Aaron Gillis is a 81 y.o.  female who has been diligent and consistent with exercises as instructed and is noting reduced pain intensity and improving prolonged sitting and standing tolerance as a result. Bilateral LE strength and tolerance for household ADLs are also improving.      Patient will continue to benefit from skilled PT services to modify and progress therapeutic interventions, address functional mobility deficits, address ROM deficits, address strength deficits, analyze and address soft tissue restrictions, analyze and cue movement patterns, analyze and modify body mechanics/ergonomics, assess and modify postural abnormalities,  and instruct in home and community integration to attain remaining goals.    ===========================================================================================    Plan:Continue therapy per initial plan/protocol at a frequency of  2 x per week for 4 weeks    Progress Towards Goals:   Short Term Goals: To be accomplished in 4 weeks:                 Patient will report compliance with HEP 1x/day to aid in rehabilitation program.                 Status at IE: Will provide HEP                 Current: In-progress, reviewed reviewed HEP and provided handout, 6/28/2022     Long Term Goals: To be accomplished in 8 weeks:                 DCCTQYI will increase bilateral LE strength to 4+/5 MMT throughout to aid in completion of ADLs.                Status at IE:4-/5                 Current: Improved to 4/5.     7/18/2022, in progress                    Patient will report pain no greater than 1-2/10 throughout entire day to aid in completion of ADLs.                Status at IE:2-9/10                 Current: In-progress, 2-3/10, 6/20/2022                    Patent will be able to sit/stand for 30 mins to be able to prepare meals, drive to appointments and complete ADLs.                Status at [address] [place] [address] with prolonged positioning                 SQPQYXS: Patient reports reduced pain with preparing meals and driving.        9/48/5175, in progress                    Patient will improve FOTO score to 58 points to demonstrate improvement in functional status.                Status at IE:FOTO score = 47 (an established functional score where 100 = no disability)                 Current: 70       7/18/2022, MET       ===========================================================================================  Subjective: \"I was out of town the past two weeks, but I have made sure to do my home exercise program every day. I have a little bit of back pain, but not bad. \"        Therapist Signature: Olegario Gonzalez PT, DPT Date: 5/87/1714   Re-Certification: NA Time: 1:29 PM         [facility] at [address]                    Phone: [phone]   Fax: [phone]  .

## 2022-07-20 ENCOUNTER — HOSPITAL ENCOUNTER (OUTPATIENT)
Dept: PHYSICAL THERAPY | Age: 82
Discharge: HOME | End: 2022-07-20
Payer: MEDICARE

## 2022-07-20 PROCEDURE — 97530 THERAPEUTIC ACTIVITIES: CPT

## 2022-07-20 PROCEDURE — 97110 THERAPEUTIC EXERCISES: CPT

## 2022-07-20 PROCEDURE — 97112 NEUROMUSCULAR REEDUCATION: CPT

## 2022-07-20 NOTE — PROGRESS NOTES
PT DAILY TREATMENT NOTE - [facility]     Patient Name: Luis Salvador  Date:2022  : 1940  [x]  Patient  Verified  Payor: Abdirahman Keep / Plan: [place] MEDICARE PART A & B / Product Type: Medicare /    In time:1430  Out time:1515  Total Treatment Time (min): 45  Total Timed Codes (min): 45   1:1 Treatment Time ( [address] only): 45   Visit #: 7  16    Treatment Area: Other low back pain [M54.59]  Pain in right hip [M25.551]    SUBJECTIVE  Pain Level (0-10 scale): 1  Any medication changes, allergies to medications, adverse drug reactions, diagnosis change, or new procedure performed?: [x] No    [] Yes (see summary sheet for update)  Subjective functional status/changes:   [] No changes reported    Patient reports that she is sore today but not in pain. OBJECTIVE    20 min Therapeutic Exercise:  [x] See flow sheet :   Rationale: increase ROM, increase strength and decrease pain to improve the patients ability to complete ADLs    10 min Therapeutic Activity:  [x]  See flow sheet :   Rationale: increase ROM, increase strength and improve coordination  to improve the patients ability to complete ADLs     15 min Neuromuscular Re-education:  [x]  See flow sheet :   Rationale: improve coordination, improve balance and increase proprioception  to improve the patients ability to complete ADLs          With   [x] TE   [] TA   [] neuro   [] other: Patient Education: [x] Review HEP    [] Progressed/Changed HEP based on:   [] positioning   [] body mechanics   [] transfers   [] heat/ice application    [] other:      Other Objective/Functional Measures: NA     Pain Level (0-10 scale) post treatment: 0    ASSESSMENT/Changes in Function: Patient responds well to treatment session. Provided close supervision during balance exercise for safety. Provided several verbal cues to recall exercise parameters. Patient is challenged with single leg balance secondary to hip strength.  Will focus on balance and strength  in future visits. No adverse effects were noted from today's treatment session    Patient will continue to benefit from skilled PT services to modify and progress therapeutic interventions, address functional mobility deficits, address ROM deficits, address strength deficits, analyze and address soft tissue restrictions, analyze and cue movement patterns, analyze and modify body mechanics/ergonomics, assess and modify postural abnormalities, address imbalance and instruct in home and community integration to attain remaining goals. []  See Plan of Care  []  See progress note/recertification  []  See Discharge Summary         Progress towards goals / Updated goals:  Short Term Goals: To be accomplished in 4 weeks:                 Patient will report compliance with HEP 1x/day to aid in rehabilitation program.                 Status at IE: Will provide HEP                 Current: In-progress, reviewed reviewed HEP and provided handout, 6/28/2022     Long Term Goals: To be accomplished in 8 weeks:                 Patient will increase bilateral LE strength to 4+/5 MMT throughout to aid in completion of ADLs. Status at IE:4-/5                 Current: Improved to 4/5.     7/18/2022, in progress                    Patient will report pain no greater than 1-2/10 throughout entire day to aid in completion of ADLs. Status at IE:2-9/10                 Current: In-progress, 2-3/10, 6/20/2022                    Patent will be able to sit/stand for 30 mins to be able to prepare meals, drive to appointments and complete ADLs. Status at [address] [place] [address] with prolonged positioning                 Current: Patient reports reduced pain with preparing meals and driving.        6/09/6103, in progress                    Patient will improve FOTO score to 58 points to demonstrate improvement in functional status.                  Status at IE:FOTO score = 47 (an established functional score where 100 = no disability)                 Current: 70       7/18/2022, MET    PLAN  []  Upgrade activities as tolerated     [x]  Continue plan of care  []  Update interventions per flow sheet       []  Discharge due to:_  []  Other:_      Bryan Vegas, PT, DPT 7/20/2022  2:53 PM    No future appointments.

## 2022-08-01 ENCOUNTER — HOSPITAL ENCOUNTER (OUTPATIENT)
Dept: PHYSICAL THERAPY | Age: 82
Discharge: HOME | End: 2022-08-01
Payer: MEDICARE

## 2022-08-01 PROCEDURE — 97110 THERAPEUTIC EXERCISES: CPT

## 2022-08-01 PROCEDURE — 97112 NEUROMUSCULAR REEDUCATION: CPT

## 2022-08-01 PROCEDURE — 97530 THERAPEUTIC ACTIVITIES: CPT

## 2022-08-01 NOTE — PROGRESS NOTES
PT DAILY TREATMENT NOTE - [facility]     Patient Name: Luis Salvador  Date:2022  : 1940  [x]  Patient  Verified  Payor: VA MEDICARE / Plan: VA MEDICARE PART A & B / Product Type: Medicare /    In time:1430  Out time:1510  Total Treatment Time (min): 40  Total Timed Codes (min): 40   1:1 Treatment Time ([address] only): 40   Visit #: 8 of 16    Treatment Area: Other low back pain [M54.59]  Pain in right hip [M25.551]    SUBJECTIVE  Pain Level (0-10 scale): 0  Any medication changes, allergies to medications, adverse drug reactions, diagnosis change, or new procedure performed?: [x] No    [] Yes (see summary sheet for update)  Subjective functional status/changes:   [] No changes reported    Patient reports compliance wit HEP. OBJECTIVE    20 min Therapeutic Exercise:  [x] See flow sheet :   Rationale: increase ROM, increase strength and decrease pain to improve the patients ability to complete ADLs    10 min Therapeutic Activity:  [x]  See flow sheet :   Rationale: increase ROM, increase strength and improve coordination  to improve the patients ability to complete ADLs     10 min Neuromuscular Re-education:  [x]  See flow sheet :   Rationale: improve coordination, improve balance and increase proprioception  to improve the patients ability to complete ADLs          With   [x] TE   [] TA   [] neuro   [] other: Patient Education: [x] Review HEP    [] Progressed/Changed HEP based on:   [] positioning   [] body mechanics   [] transfers   [] heat/ice application    [] other:      Other Objective/Functional Measures: NA     Pain Level (0-10 scale) post treatment: 0    ASSESSMENT/Changes in Function:     Patient responds well to treatment session. Advanced HEP by increasing resistance band. She required close supervision during balance activities for safety. Patient was challenged with exercise prescribed.  No adverse effects were noted from today's treatment session      Patient will continue to benefit from skilled PT services to modify and progress therapeutic interventions, address functional mobility deficits, address ROM deficits, address strength deficits, analyze and address soft tissue restrictions, analyze and cue movement patterns, analyze and modify body mechanics/ergonomics, assess and modify postural abnormalities, address imbalance and instruct in home and community integration to attain remaining goals. []  See Plan of Care  []  See progress note/recertification  []  See Discharge Summary         Progress towards goals / Updated goals:  Short Term Goals: To be accomplished in 4 weeks:                 Patient will report compliance with HEP 1x/day to aid in rehabilitation program.                 Status at IE: Will provide HEP                 Current: Met, provided resistance band to advance HEP, 8/1/2022     Long Term Goals: To be accomplished in 8 weeks:                 Patient will increase bilateral LE strength to 4+/5 MMT throughout to aid in completion of ADLs. Status at IE:4-/5                 Current: Improved to 4/5.     7/18/2022, in progress                    Patient will report pain no greater than 1-2/10 throughout entire day to aid in completion of ADLs. Status at IE:2-9/10                 Current: In-progress, 2-3/10, 6/20/2022                    Patent will be able to sit/stand for 30 mins to be able to prepare meals, drive to appointments and complete ADLs. Status at [address] [place] [address] with prolonged positioning                 Current: Patient reports reduced pain with preparing meals and driving.        9/55/4776, in progress                    Patient will improve FOTO score to 58 points to demonstrate improvement in functional status.                  Status at IE:FOTO score = 47 (an established functional score where 100 = no disability)                 Current: 70       7/18/2022, MET    PLAN  []  Upgrade activities as tolerated     [x] Continue plan of care  []  Update interventions per flow sheet       []  Discharge due to:_  []  Other:_      Wally Knutson, PT, DPT 8/1/2022  2:06 PM    Future Appointments   Date Time Provider [place]   8/1/2022  2:30 PM Yuval Garduno, PT [facility]   8/3/2022  2:30 PM Yuval Garduno PT [facility]   8/11/2022 10:15 AM Yuval Garduno PT [facility]   8/15/2022 11:45 AM Yuval Garduno, PT [facility]   8/18/2022  4:00 PM Lio Albert [facility]

## 2022-08-03 ENCOUNTER — HOSPITAL ENCOUNTER (OUTPATIENT)
Dept: PHYSICAL THERAPY | Age: 82
Discharge: HOME | End: 2022-08-03
Payer: MEDICARE

## 2022-08-03 PROCEDURE — 97110 THERAPEUTIC EXERCISES: CPT

## 2022-08-03 NOTE — PROGRESS NOTES
PT DAILY TREATMENT NOTE - [facility]     Patient Name: Dominick Maciel  IDXK:[id number]  : 1940  [x]  Patient  Verified  Payor: Lokesh Vital / Plan[place] / Product Type: Medicare /    In time:1430  Out time:1455  Total Treatment Time (min): 25  Total Timed Codes (min): 25   1:1 Treatment Time ([facility] only): 25   Visit #: 9 of 16    Treatment Area: Other low back pain [M54.59]  Pain in right hip [M25.551]    SUBJECTIVE  Pain Level (0-10 scale): 0  Any medication changes, allergies to medications, adverse drug reactions, diagnosis change, or new procedure performed?: [x] No    [] Yes (see summary sheet for update)  Subjective functional status/changes:   [] No changes reported    Patient reports that the referring physician was happy with her progress. She states that she would like to be discharged as she has regained PLOF. OBJECTIVE    25 min Therapeutic Exercise:  [x] See flow sheet :   Rationale: increase ROM, increase strength and decrease pain to improve the patients ability to complete ADLs           With   [x] TE   [] TA   [] neuro   [] other: Patient Education: [x] Review HEP    [] Progressed/Changed HEP based on:   [] positioning   [] body mechanics   [] transfers   [] heat/ice application    [] other:      Other Objective/Functional Measures:   MMT 4+/5  FOTO 70  Patient reports that she can stand and walk for extended periods of time more than 30 minutes without pain    Pain Level (0-10 scale) post treatment: 0    ASSESSMENT/Changes in Function:   Patient responds well to treatment session. No adverse effects were noted from today's treatment session. Patient is being discharged as she has met 100% of her short and long term goals. She has gained strength and functional mobility resulting in a return to PLOF.  She is compliant with HEP and plans to reduce future episodes of care with participation in an independent exercise program. Provided HEP to promote seamless transition to independent management of her condition. []  See Plan of Care  []  See progress note/recertification  [x]  See Discharge Summary         Progress towards goals / Updated goals:  Short Term Goals: To be accomplished in 4 weeks:                 Patient will report compliance with HEP 1x/day to aid in rehabilitation program.                 Status at IE: Will provide HEP                 Current: Met, provided resistance band to advance HEP, 8/1/2022     Long Term Goals: To be accomplished in 8 weeks:                 Patient will increase bilateral LE strength to 4+/5 MMT throughout to aid in completion of ADLs. Status at IE:4-/5                 Current: Met, 4+/5, 8/3/2022                   Patient will report pain no greater than 1-2/10 throughout entire day to aid in completion of ADLs. Status at IE:2-9/10                 Current:Met, 0-1/10 with ADLs, 8/3/2022                    Patent will be able to sit/stand for 30 mins to be able to prepare meals, drive to appointments and complete ADLs. Status at [address] [place] [address] with prolonged positioning                 Current: Met, patient reports that she can stand and walk for extended periods of time more than 30 minutes without pain, 8/3/2022                    Patient will improve FOTO score to 58 points to demonstrate improvement in functional status.                  Status at IE:FOTO score = 47 (an established functional score where 100 = no disability)                 Current:  Met, 70, 7/18/2022       PLAN  []  Upgrade activities as tolerated     [x]  Continue plan of care  []  Update interventions per flow sheet       [x]  Discharge due to:Goals Met  []  Other:_      Carmine Britton PT, DPT 8/3/2022  2:24 PM    Future Appointments   Date Time Provider [place]   8/3/2022  2:30 PM DORI Arriola THE [facility]   8/11/2022 10:15 AM DORI Arriola THE [facility]   8/15/2022 11:45 AM Ayala Torrez PT [address] THE [facility]   8/18/2022  4:00 PM Geetha Howard, PT [facility] THE [facility]

## 2022-08-03 NOTE — PROGRESS NOTES
[facility] at [address] [place] [address] Drive: [phone]   Fax: [phone]  Discharge Summary  Patient Name: Momo Holley : 1940   Medical   Diagnosis: Other low back pain [M54.59]  Pain in right hip [M25.551] Treatment Diagnosis: Low back pain  Right hip pain   Onset Date: 2021     Referral Source: Kelli Salmeron MD [facility]): 8/3/2022   Prior Hospitalization: See medical history Provider #: [id number]   Prior Level of Function: exercise, gardening, yard work   Comorbidities: Thyroidism, osteoporosis   Medications: Verified on Patient Summary List      ===========================================================================================  Assessment / Summary of Care: Momo Holley is a 81 y.o. female with low back pain and numbness in bilateral feet since 2021 when she fell and broke her right hip. 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 Patient is being discharged as she has met 100% of her short and long term goals. She has gained strength and functional mobility resulting in a return to [facility]. She is compliant with HEP and plans to reduce future episodes of care with participation in an independent exercise program. Provided HEP to promote seamless transition to independent management of her condition    ===========================================================================================    Plan: Discharge to independent HEP. Goals:   Short Term Goals: To be accomplished in 4 weeks:                 Patient will report compliance with HEP 1x/day to aid in rehabilitation program.                 Status at IE: Will provide HEP                 Current: Met, provided resistance band to advance HEP, 2022     Long Term Goals: To be accomplished in 8 weeks:                 Patient will increase bilateral LE strength to 4+/5 MMT throughout to aid in completion of ADLs. Status at IE:4-/5                 Current: Met, 4+/5, 8/3/2022                    Patient will report pain no greater than 1-2/10 throughout entire day to aid in completion of ADLs. Status at IE:2-9/10                 Current:Met, 0-1/10 with ADLs, 8/3/2022                    Patent will be able to sit/stand for 30 mins to be able to prepare meals, drive to appointments and complete ADLs. Status at [address] [place] [address] with prolonged positioning                 Current: Met, patient reports that she can stand and walk for extended periods of time more than 30 minutes without pain, 8/3/2022                    Patient will improve FOTO score to 58 points to demonstrate improvement in functional status. Status at IE:FOTO score = 47 (an established functional score where 100 = no disability)                 Current:  Met, 70, 7/18/2022       ===========================================================================================  Subjective: Patient reports that the referring physician was happy with her progress.  She states that she would like to be discharged as she has regained PLOF      Objective:   MMT 4+/5  FOTO 70  Patient reports that she can stand and walk for extended periods of time more than 30 minutes without pain    Therapist Signature: Marshall Cranker, PT, DPT Date: 8/3/2022     Time: 3:04 PM

## 2022-08-11 ENCOUNTER — APPOINTMENT (OUTPATIENT)
Dept: PHYSICAL THERAPY | Age: 82
End: 2022-08-11
Payer: MEDICARE

## 2022-08-15 ENCOUNTER — APPOINTMENT (OUTPATIENT)
Dept: PHYSICAL THERAPY | Age: 82
End: 2022-08-15
Payer: MEDICARE

## 2022-08-16 ENCOUNTER — APPOINTMENT (OUTPATIENT)
Dept: PHYSICAL THERAPY | Age: 82
End: 2022-08-16
Payer: MEDICARE

## 2022-08-22 ENCOUNTER — APPOINTMENT (OUTPATIENT)
Dept: PHYSICAL THERAPY | Age: 82
End: 2022-08-22
Payer: MEDICARE

## 2022-08-24 ENCOUNTER — APPOINTMENT (OUTPATIENT)
Dept: PHYSICAL THERAPY | Age: 82
End: 2022-08-24
Payer: MEDICARE

## 2023-06-06 ENCOUNTER — HOSPITAL ENCOUNTER (EMERGENCY)
Facility: HOSPITAL | Age: 83
Discharge: HOME | End: 2023-06-06
Attending: EMERGENCY MEDICINE
Payer: MEDICARE

## 2023-06-06 VITALS
BODY MASS INDEX: 20.4 KG/M2 | HEIGHT: 67 IN | SYSTOLIC BLOOD PRESSURE: 164 MMHG | DIASTOLIC BLOOD PRESSURE: 91 MMHG | OXYGEN SATURATION: 100 % | HEART RATE: 117 BPM | WEIGHT: 130 LBS | RESPIRATION RATE: 16 BRPM | TEMPERATURE: 98.4 F

## 2023-06-06 DIAGNOSIS — S51.811A SKIN TEAR OF RIGHT FOREARM WITHOUT COMPLICATION, INITIAL ENCOUNTER: Primary | ICD-10-CM

## 2023-06-06 DIAGNOSIS — R42 DIZZINESS: ICD-10-CM

## 2023-06-06 LAB
ANION GAP SERPL CALC-SCNC: 6 MMOL/L (ref 3–18)
APPEARANCE UR: CLEAR
BACTERIA URNS QL MICRO: NEGATIVE /HPF
BASOPHILS # BLD: 0.1 K/UL (ref 0–0.1)
BASOPHILS NFR BLD: 1 % (ref 0–2)
BILIRUB UR QL: NEGATIVE
BUN SERPL-MCNC: 35 MG/DL (ref 7–18)
BUN/CREAT SERPL: 32 (ref 12–20)
CALCIUM SERPL-MCNC: 9.4 MG/DL (ref 8.5–10.1)
CHLORIDE SERPL-SCNC: 103 MMOL/L (ref 100–111)
CO2 SERPL-SCNC: 26 MMOL/L (ref 21–32)
COLOR UR: YELLOW
CREAT SERPL-MCNC: 1.1 MG/DL (ref 0.6–1.3)
DIFFERENTIAL METHOD BLD: ABNORMAL
EKG ATRIAL RATE: 121 BPM
EKG DIAGNOSIS: NORMAL
EKG P AXIS: 78 DEGREES
EKG P-R INTERVAL: 202 MS
EKG Q-T INTERVAL: 284 MS
EKG QRS DURATION: 80 MS
EKG QTC CALCULATION (BAZETT): 403 MS
EKG R AXIS: 81 DEGREES
EKG T AXIS: -1 DEGREES
EKG VENTRICULAR RATE: 121 BPM
EOSINOPHIL # BLD: 0.3 K/UL (ref 0–0.4)
EOSINOPHIL NFR BLD: 3 % (ref 0–5)
EPITH CASTS URNS QL MICRO: NORMAL /LPF (ref 0–5)
ERYTHROCYTE [DISTWIDTH] IN BLOOD BY AUTOMATED COUNT: 12.9 % (ref 11.6–14.5)
GLUCOSE SERPL-MCNC: 112 MG/DL (ref 74–99)
GLUCOSE UR STRIP.AUTO-MCNC: NEGATIVE MG/DL
HCT VFR BLD AUTO: 33.9 % (ref 35–45)
HGB BLD-MCNC: 11 G/DL (ref 12–16)
HGB UR QL STRIP: NEGATIVE
HYALINE CASTS URNS QL MICRO: NORMAL /LPF (ref 0–2)
IMM GRANULOCYTES # BLD AUTO: 0 K/UL (ref 0–0.04)
IMM GRANULOCYTES NFR BLD AUTO: 0 % (ref 0–0.5)
KETONES UR QL STRIP.AUTO: NEGATIVE MG/DL
LEUKOCYTE ESTERASE UR QL STRIP.AUTO: NEGATIVE
LYMPHOCYTES # BLD: 4.5 K/UL (ref 0.9–3.6)
LYMPHOCYTES NFR BLD: 35 % (ref 21–52)
MAGNESIUM SERPL-MCNC: 1.6 MG/DL (ref 1.6–2.6)
MCH RBC QN AUTO: 31.1 PG (ref 24–34)
MCHC RBC AUTO-ENTMCNC: 32.4 G/DL (ref 31–37)
MCV RBC AUTO: 95.8 FL (ref 78–100)
MONOCYTES # BLD: 0.8 K/UL (ref 0.05–1.2)
MONOCYTES NFR BLD: 6 % (ref 3–10)
NEUTS SEG # BLD: 7 K/UL (ref 1.8–8)
NEUTS SEG NFR BLD: 55 % (ref 40–73)
NITRITE UR QL STRIP.AUTO: NEGATIVE
NRBC # BLD: 0 K/UL (ref 0–0.01)
NRBC BLD-RTO: 0 PER 100 WBC
PH UR STRIP: 5.5 (ref 5–8)
PLATELET # BLD AUTO: 251 K/UL (ref 135–420)
PMV BLD AUTO: 9.7 FL (ref 9.2–11.8)
POTASSIUM SERPL-SCNC: 3.6 MMOL/L (ref 3.5–5.5)
PROT UR STRIP-MCNC: 30 MG/DL
RBC # BLD AUTO: 3.54 M/UL (ref 4.2–5.3)
RBC #/AREA URNS HPF: NEGATIVE /HPF (ref 0–5)
SODIUM SERPL-SCNC: 135 MMOL/L (ref 136–145)
SP GR UR REFRACTOMETRY: 1.02 (ref 1–1.03)
TROPONIN I SERPL HS-MCNC: 11 NG/L (ref 0–54)
UROBILINOGEN UR QL STRIP.AUTO: 1 EU/DL (ref 0.2–1)
WBC # BLD AUTO: 12.7 K/UL (ref 4.6–13.2)
WBC URNS QL MICRO: NORMAL /HPF (ref 0–5)

## 2023-06-06 PROCEDURE — 83735 ASSAY OF MAGNESIUM: CPT

## 2023-06-06 PROCEDURE — 84484 ASSAY OF TROPONIN QUANT: CPT

## 2023-06-06 PROCEDURE — 85025 COMPLETE CBC W/AUTO DIFF WBC: CPT

## 2023-06-06 PROCEDURE — 93005 ELECTROCARDIOGRAM TRACING: CPT | Performed by: EMERGENCY MEDICINE

## 2023-06-06 PROCEDURE — 99284 EMERGENCY DEPT VISIT MOD MDM: CPT

## 2023-06-06 PROCEDURE — 2580000003 HC RX 258: Performed by: EMERGENCY MEDICINE

## 2023-06-06 PROCEDURE — 81001 URINALYSIS AUTO W/SCOPE: CPT

## 2023-06-06 PROCEDURE — 80048 BASIC METABOLIC PNL TOTAL CA: CPT

## 2023-06-06 RX ORDER — 0.9 % SODIUM CHLORIDE 0.9 %
1000 INTRAVENOUS SOLUTION INTRAVENOUS ONCE
Status: COMPLETED | OUTPATIENT
Start: 2023-06-06 | End: 2023-06-06

## 2023-06-06 RX ADMIN — SODIUM CHLORIDE 1000 ML: 9 INJECTION, SOLUTION INTRAVENOUS at 03:01

## 2023-06-06 ASSESSMENT — PAIN SCALES - GENERAL: PAINLEVEL_OUTOF10: 4

## 2023-06-06 ASSESSMENT — PAIN - FUNCTIONAL ASSESSMENT: PAIN_FUNCTIONAL_ASSESSMENT: 0-10

## 2023-06-06 NOTE — ED PROVIDER NOTES
steri-strips. Pt was dizzy and is tachycardic upon arrival so will obtain screening labs due to age. However, she reports that she is always tachycardic for doctors due to anxiety. And that she otherwise feels normal. UA is normal, no signs of UTI. No other infectious symptoms. 3:18 AM  Labs reassuring. Pt feels good. Stable for dc. The decision to perform testing and results were discussed with the patient and spouse. I discussed each of these tests and considerations with the patient and spouse. They agree with the plan of discharge and outpatient follow-up. Additional Considerations:  Severity of condition: mod  Acuity of condition: acute  As above    Critical Care Time:     none    Denita Gurrola MD    Diagnosis and Disposition     DISPOSITION:  DISPOSITION Decision To Discharge 06/06/2023 03:18:06 AM      CLINICAL IMPRESSION:  1. Skin tear of right forearm without complication, initial encounter    2. Dizziness        I, Denita Gurrola am the primary clinician of record. Dragon Disclaimer     Please note that this dictation was completed with [employer], the computer voice recognition software. Quite often unanticipated grammatical, syntax, homophones, and other interpretive errors are inadvertently transcribed by the computer software. Please disregard these errors. Please excuse any errors that have escaped final proofreading.     Denita Gurrola MD  (Electronically signed)         Denita Gurrola MD, MD  06/06/23 0939

## 2024-04-29 ENCOUNTER — TELEPHONE (OUTPATIENT)
Facility: HOSPITAL | Age: 84
End: 2024-04-29

## 2024-05-01 ENCOUNTER — HOSPITAL ENCOUNTER (OUTPATIENT)
Facility: HOSPITAL | Age: 84
Setting detail: RECURRING SERIES
Discharge: HOME | End: 2024-05-04
Payer: MEDICARE

## 2024-05-01 PROCEDURE — 97161 PT EVAL LOW COMPLEX 20 MIN: CPT

## 2024-05-01 PROCEDURE — 97110 THERAPEUTIC EXERCISES: CPT

## 2024-05-01 NOTE — PROGRESS NOTES
[facility] at [facility]  [address] [address]  Phone: [phone]   Fax: [phone]    Plan of Care/ Statement of Necessity for Physical Therapy Services        Patient name: Christelle Chaudhary Start of Care: 2024   Referral source: Justice Schneider MD : 1940    Medical Diagnosis: Other low back pain [M54.59]      Onset Date: 2024    Treatment Diagnosis:  M54.59  OTHER LOWER BACK PAIN                                          Prior Hospitalization: see medical history Provider#: [id number]   Medications: Verified on Patient Summary List     Comorbidities: hypothyroid, right JUNIOR s/p fall and hip fx  Prior Level of Function: unrestricted in community ambulation and household ADLs       The Plan of Care and following information is based on the information from the initial evaluation.  Assessment/ key information: Patient presents with history of severe lumbar degenerative disc and joint disease and associated kyphoscoliosis and report of new onset severe low back pain significantly interfering with ADLs. Physical signs and symptoms indicative of acute exacerbation of underlying lumbar degenerative disc and joint disease. Patient currently exhibits decreased trunk AROM and strength, moderate difficulty with sit to stand transfers, abnormal gait pattern with declining independence in mobility (increasing dependence on assistive devices), moderate difficulty completing household ADLs due to moderate to severe low back pain.    Patient will continue to benefit from skilled PT services to modify and progress therapeutic interventions, address functional mobility deficits, address ROM deficits, address strength deficits, analyze and address soft tissue restrictions, analyze and cue movement patterns, analyze and modify body mechanics/ergonomics and assess and modify postural abnormalities to attain remaining goals.    Evaluation Complexity HistoryMEDIUM  Complexity : 1-2

## 2024-05-03 ENCOUNTER — HOSPITAL ENCOUNTER (OUTPATIENT)
Facility: HOSPITAL | Age: 84
Setting detail: RECURRING SERIES
Discharge: HOME | End: 2024-05-06
Payer: MEDICARE

## 2024-05-03 PROCEDURE — 97110 THERAPEUTIC EXERCISES: CPT

## 2024-05-03 PROCEDURE — 97530 THERAPEUTIC ACTIVITIES: CPT

## 2024-05-03 PROCEDURE — 97112 NEUROMUSCULAR REEDUCATION: CPT

## 2024-05-03 NOTE — PROGRESS NOTES
PHYSICAL / OCCUPATIONAL THERAPY - DAILY TREATMENT NOTE     Patient Name: Christelle Chaudhary    Date: 5/3/2024    : 1940  Insurance: Payor: MEDICARE / Plan: MEDICARE PART A AND B / Product Type: *No Product type* /      Patient  verified Yes     Visit #   Current / Total 1 16   Time   In / Out 9:30 am  10:08 am   Pain   In / Out 8 6   Subjective Functional Status/Changes: Pt reports she still has numbness in her left leg and pain in her back. States she has been trying her home exercises.   Changes to:  Allergies, Med Hx, Sx Hx?   no       TREATMENT AREA =  Other low back pain [M54.59]    OBJECTIVE  Therapeutic Procedures:  Tx Min Billable or 1:1 Min (if diff from Tx Min) Procedure, Rationale, Specifics   22  97450 Therapeutic Exercise (timed):  increase ROM, strength, coordination, balance, and proprioception to improve patient's ability to progress to PLOF and address remaining functional goals. (see flow sheet as applicable)    Details if applicable:       8  31683 Neuromuscular Re-Education (timed):  improve balance, coordination, kinesthetic sense, posture, core stability and proprioception to improve patient's ability to develop conscious control of individual muscles and awareness of position of extremities in order to progress to PLOF and address remaining functional goals. (see flow sheet as applicable)    Details if applicable:     8  46139 Therapeutic Activity (timed):  use of dynamic activities replicating functional movements to increase ROM, strength, coordination, balance, and proprioception in order to improve patient's ability to progress to PLOF and address remaining functional goals.  (see flow sheet as applicable)     Details if applicable:           Details if applicable:            Details if applicable:     38  [facility] Totals Reminder: bill using total billable min of TIMED therapeutic procedures (example: do not include dry needle or estim unattended, both untimed codes, in totals to 
No

## 2024-05-06 NOTE — PROGRESS NOTES
PHYSICAL / OCCUPATIONAL THERAPY - DAILY TREATMENT NOTE     Patient Name: Christelle Chaudhary    Date: 2024    : 1940  Insurance: Payor: MEDICARE / Plan: MEDICARE PART A AND B / Product Type: *No Product type* /      Patient  verified Yes     Visit #   Current / Total 3 16   Time   In / Out 1011 am 10 49 am   Pain   In / Out 5/10 5/10   Subjective Functional Status/Changes: Pt reports her back is bothering her today.   Changes to:  Allergies, Med Hx, Sx Hx?   no       TREATMENT AREA =  Other low back pain [M54.59]    OBJECTIVE  Therapeutic Procedures:  Tx Min Billable or 1:1 Min (if diff from Tx Min) Procedure, Rationale, Specifics   15 15 37195 Therapeutic Exercise (timed):  increase ROM, strength, coordination, balance, and proprioception to improve patient's ability to progress to PLOF and address remaining functional goals. (see flow sheet as applicable)    Details if applicable:       8 8 96357 Neuromuscular Re-Education (timed):  improve balance, coordination, kinesthetic sense, posture, core stability and proprioception to improve patient's ability to develop conscious control of individual muscles and awareness of position of extremities in order to progress to PLOF and address remaining functional goals. (see flow sheet as applicable)    Details if applicable:     15 15 44235 Therapeutic Activity (timed):  use of dynamic activities replicating functional movements to increase ROM, strength, coordination, balance, and proprioception in order to improve patient's ability to progress to PLOF and address remaining functional goals.  (see flow sheet as applicable)     Details if applicable:           Details if applicable:            Details if applicable:     38 38 [facility] Totals Reminder: bill using total billable min of TIMED therapeutic procedures (example: do not include dry needle or estim unattended, both untimed codes, in totals to left)  8-22 min = 1 unit; 23-37 min = 2 units; 38-52 min = 3

## 2024-05-07 ENCOUNTER — HOSPITAL ENCOUNTER (OUTPATIENT)
Facility: HOSPITAL | Age: 84
Setting detail: RECURRING SERIES
Discharge: HOME | End: 2024-05-10
Payer: MEDICARE

## 2024-05-07 PROCEDURE — 97112 NEUROMUSCULAR REEDUCATION: CPT

## 2024-05-07 PROCEDURE — 97530 THERAPEUTIC ACTIVITIES: CPT

## 2024-05-07 PROCEDURE — 97110 THERAPEUTIC EXERCISES: CPT

## 2024-05-08 NOTE — PROGRESS NOTES
PHYSICAL / OCCUPATIONAL THERAPY - DAILY TREATMENT NOTE     Patient Name: Christelle Chaudhary    Date: 2024    : 1940  Insurance: Payor: MEDICARE / Plan: MEDICARE PART A AND B / Product Type: *No Product type* /      Patient  verified Yes     Visit #   Current / Total 4 16   Time   In / Out 1010 am 1051 am   Pain   In / Out -7/10 8/10   Subjective Functional Status/Changes: Pt reports she hurt some after last session but that happens any time she does movement.    Changes to:  Allergies, Med Hx, Sx Hx?   no       TREATMENT AREA =  Other low back pain [M54.59]    OBJECTIVE  Therapeutic Procedures:  Tx Min Billable or 1:1 Min (if diff from Tx Min) Procedure, Rationale, Specifics   15 15 74984 Therapeutic Exercise (timed):  increase ROM, strength, coordination, balance, and proprioception to improve patient's ability to progress to PLOF and address remaining functional goals. (see flow sheet as applicable)    Details if applicable:        08843 Neuromuscular Re-Education (timed):  improve balance, coordination, kinesthetic sense, posture, core stability and proprioception to improve patient's ability to develop conscious control of individual muscles and awareness of position of extremities in order to progress to PLOF and address remaining functional goals. (see flow sheet as applicable)    Details if applicable:     15 15 85810 Therapeutic Activity (timed):  use of dynamic activities replicating functional movements to increase ROM, strength, coordination, balance, and proprioception in order to improve patient's ability to progress to PLOF and address remaining functional goals.  (see flow sheet as applicable)     Details if applicable:           Details if applicable:            Details if applicable:     41 41 [facility] Totals Reminder: bill using total billable min of TIMED therapeutic procedures (example: do not include dry needle or estim unattended, both untimed codes, in totals to left)  8-22 min

## 2024-05-09 ENCOUNTER — HOSPITAL ENCOUNTER (OUTPATIENT)
Facility: HOSPITAL | Age: 84
Setting detail: RECURRING SERIES
Discharge: HOME | End: 2024-05-12
Payer: MEDICARE

## 2024-05-09 PROCEDURE — 97112 NEUROMUSCULAR REEDUCATION: CPT

## 2024-05-09 PROCEDURE — 97530 THERAPEUTIC ACTIVITIES: CPT

## 2024-05-09 PROCEDURE — 97110 THERAPEUTIC EXERCISES: CPT

## 2024-05-14 ENCOUNTER — HOSPITAL ENCOUNTER (OUTPATIENT)
Facility: HOSPITAL | Age: 84
Setting detail: RECURRING SERIES
Discharge: HOME | End: 2024-05-17
Payer: MEDICARE

## 2024-05-14 PROCEDURE — 97112 NEUROMUSCULAR REEDUCATION: CPT

## 2024-05-14 PROCEDURE — 97110 THERAPEUTIC EXERCISES: CPT

## 2024-05-14 PROCEDURE — 97530 THERAPEUTIC ACTIVITIES: CPT

## 2024-05-14 NOTE — PROGRESS NOTES
= 3 units; 53-67 min = 4 units; 68-82 min = 5 units   Total Total     TOTAL TREATMENT TIME: 40     [x]  Patient Education billed concurrently with other procedures   [x] Review HEP    [] Progressed/Changed HEP, detail:    [] Other detail:       Objective Information/Functional Measures/Assessment  Patient reported severe pain at start of care. Introduced seated hip isometric and she reported a reduction in symptoms. Added the exercise to HEP to assist with management of her condition. Will advance exercise as tolerated.       Patient will continue to benefit from skilled PT services to modify and progress therapeutic interventions, analyze and address functional mobility deficits, analyze and address ROM deficits, analyze and address strength deficits, analyze and address soft tissue restrictions, analyze and cue for proper movement patterns, analyze and address imbalance/dizziness, and instruct in home and community integration to address functional deficits and attain remaining goals.    Progress toward goals / Updated goals:  []  See Progress Note/Recertification    Short Term Goals: To be accomplished in 4 weeks:                 Patient will report compliance with HEP 1x/day to aid in rehabilitation program.                 Status at IE: Patient instructed in and provided written copy of initial Home Exercise Program.                 Current: In-progress, added hip extension isometric to HEP, 5/14/2024                    Patient will increase lumbar ROM to flexion fingertips 5 inches from floor and extension 20 degrees to aid in completion of ADLs.                 Status at IE: flexion fingertips 13 inches from floor, extension 11 degrees                 Current: Same as IE     Long Term Goals: To be accomplished in 8 weeks:                 Patient will increase Kunal LE strength to 4+/5 MMT throughout to aid in completion of ADLs.                 Status at IE: bilateral LE strength 3+/5                 Current:

## 2024-05-16 ENCOUNTER — HOSPITAL ENCOUNTER (OUTPATIENT)
Facility: HOSPITAL | Age: 84
Setting detail: RECURRING SERIES
Discharge: HOME | End: 2024-05-19
Payer: MEDICARE

## 2024-05-16 PROCEDURE — 97110 THERAPEUTIC EXERCISES: CPT

## 2024-05-16 PROCEDURE — 97112 NEUROMUSCULAR REEDUCATION: CPT

## 2024-05-16 PROCEDURE — 97530 THERAPEUTIC ACTIVITIES: CPT

## 2024-05-16 NOTE — PROGRESS NOTES
PHYSICAL / OCCUPATIONAL THERAPY - DAILY TREATMENT NOTE     Patient Name: Christelle Chaudhary    Date: 2024    : 1940  Insurance: Payor: MEDICARE / Plan: MEDICARE PART A AND B / Product Type: *No Product type* /      Patient  verified Yes     Visit #   Current / Total 6 16   Time   In / Out 1:30 pm 2:10 pm   Pain   In / Out 6 7 \"better\"   Subjective Functional Status/Changes: Pt reports her left hip is bothering her today though notes it is feeling better than it had been previously.   Changes to:  Allergies, Med Hx, Sx Hx?   no       TREATMENT AREA =  Other low back pain [M54.59]    OBJECTIVE  Therapeutic Procedures:  Tx Min Billable or 1:1 Min (if diff from Tx Min) Procedure, Rationale, Specifics   20  54248 Therapeutic Exercise (timed):  increase ROM, strength, coordination, balance, and proprioception to improve patient's ability to progress to PLOF and address remaining functional goals. (see flow sheet as applicable)    Details if applicable:       10  72775 Neuromuscular Re-Education (timed):  improve balance, coordination, kinesthetic sense, posture, core stability and proprioception to improve patient's ability to develop conscious control of individual muscles and awareness of position of extremities in order to progress to PLOF and address remaining functional goals. (see flow sheet as applicable)    Details if applicable:     10  17076 Therapeutic Activity (timed):  use of dynamic activities replicating functional movements to increase ROM, strength, coordination, balance, and proprioception in order to improve patient's ability to progress to PLOF and address remaining functional goals.  (see flow sheet as applicable)     Details if applicable:           Details if applicable:            Details if applicable:     40  [facility] Totals Reminder: bill using total billable min of TIMED therapeutic procedures (example: do not include dry needle or estim unattended, both untimed codes, in totals to

## 2024-05-17 ENCOUNTER — TELEPHONE (OUTPATIENT)
Facility: HOSPITAL | Age: 84
End: 2024-05-17

## 2024-05-20 ENCOUNTER — APPOINTMENT (OUTPATIENT)
Facility: HOSPITAL | Age: 84
End: 2024-05-20
Payer: MEDICARE

## 2024-05-22 ENCOUNTER — APPOINTMENT (OUTPATIENT)
Facility: HOSPITAL | Age: 84
End: 2024-05-22
Payer: MEDICARE

## 2024-05-22 ENCOUNTER — HOSPITAL ENCOUNTER (OUTPATIENT)
Facility: HOSPITAL | Age: 84
Setting detail: RECURRING SERIES
Discharge: HOME | End: 2024-05-25
Payer: MEDICARE

## 2024-05-22 PROCEDURE — 97530 THERAPEUTIC ACTIVITIES: CPT

## 2024-05-22 PROCEDURE — 97110 THERAPEUTIC EXERCISES: CPT

## 2024-05-22 PROCEDURE — 97112 NEUROMUSCULAR REEDUCATION: CPT

## 2024-05-22 NOTE — PROGRESS NOTES
Details if applicable:     40  [facility] Totals Reminder: bill using total billable min of TIMED therapeutic procedures (example: do not include dry needle or estim unattended, both untimed codes, in totals to left)  8-22 min = 1 unit; 23-37 min = 2 units; 38-52 min = 3 units; 53-67 min = 4 units; 68-82 min = 5 units   Total Total     TOTAL TREATMENT TIME:        40     [x]  Patient Education billed concurrently with other procedures   [x] Review HEP    [] Progressed/Changed HEP, detail:    [] Other detail:       Objective Information/Functional Measures/Assessment    Pt reports increased soreness in left hip, but able to participate in all aspects of treatment today. Pt performed lumbar mobility exercises/LE strengthening exercises to improve functional mobility in Kunal LE's and activity tolerance. Pt required minimal verbal cueing to focus on eccentric control with LE strengthening exercises. Pt will benefit from skilled PT services to increase overall LE strength and to progress towards unmet goals    Patient will continue to benefit from skilled PT / OT services to modify and progress therapeutic interventions, analyze and address functional mobility deficits, analyze and address ROM deficits, analyze and address strength deficits, analyze and address soft tissue restrictions, analyze and cue for proper movement patterns, analyze and address imbalance/dizziness, and instruct in home and community integration to address functional deficits and attain remaining goals.    Progress toward goals / Updated goals:  []  See Progress Note/Recertification    Short Term Goals: To be accomplished in 4 weeks:                 Patient will report compliance with HEP 1x/day to aid in rehabilitation program.                 Status at IE: Patient instructed in and provided written copy of initial Home Exercise Program.                 Current: Pt reports partial HEP compliance. Educated Pt on importance of daily HEP compliance

## 2024-05-28 ENCOUNTER — APPOINTMENT (OUTPATIENT)
Facility: HOSPITAL | Age: 84
End: 2024-05-28
Payer: MEDICARE

## 2024-05-30 ENCOUNTER — APPOINTMENT (OUTPATIENT)
Facility: HOSPITAL | Age: 84
End: 2024-05-30
Payer: MEDICARE

## 2024-06-04 ENCOUNTER — HOSPITAL ENCOUNTER (OUTPATIENT)
Facility: HOSPITAL | Age: 84
Setting detail: RECURRING SERIES
Discharge: HOME | End: 2024-06-07
Payer: MEDICARE

## 2024-06-04 PROCEDURE — 97530 THERAPEUTIC ACTIVITIES: CPT

## 2024-06-04 PROCEDURE — 97112 NEUROMUSCULAR REEDUCATION: CPT

## 2024-06-04 PROCEDURE — 97110 THERAPEUTIC EXERCISES: CPT

## 2024-06-04 NOTE — PROGRESS NOTES
6/4/24: similar to IE, pain limited with activity at times                    Patient will report pain no greater than 1-2/10 throughout entire day to aid in completion of ADLs.                 Status at IE: 5-8/10                 Current: pt reports \"I don't know, its just there, but I think its better\". 6/4/24                    Patient will decrease TUG by 8 seconds to demonstrate increased safety in mobility.                 Status at IE: 22.8 seconds with use of small base tripod cane                  Current: 16.1 sec improving 6/4/24                    Patient will improve 30 second sit to stand score to 12 repetitions to demonstrate increased proximal bilateral LE strength and associated increased safety in mobility.                 Status at IE: 8 repetitions with use of hands on knees for assist.                  Current: 10 reps void of UE assist, improved 6/4/24                    Patient will improve FOTO (an established functional score where 100 = no disability) score to 57 points to demonstrate improvement in functional status.                 Status at IE:40                 Current: Same as IE, will complete NV 6/4/24    Summary of Care/ Key Functional Changes: Pt has attended 8 PT appts for back pain noting some limited reduction in pain though notes some continued LLE paresthesias. She demonstrates improvement in trunk flexion ROM, as well as TUG and 30 second sit to stands sore with reports of some improvement in symptoms. She will continue to benefit from skilled PT to address her impairments and facilitate improved QOL.     Progress note completed at therapist's earliest opportunity due to patient absence from care.    ASSESSMENT/RECOMMENDATIONS:   Patient would benefit from the continuation of skilled rehab interventions for functional progress to achieving above stated clinically significant goals. Continue per plan of care.     Thank you for this referral.   Francisco Nix, PT 6/4/2024 
6/4/24    PLAN  Yes  Continue plan of care  []  Upgrade activities as tolerated  []  Discharge due to :  []  Other:    Francisco Nix PT    6/4/2024    10:50 AM    Future Appointments   Date Time Provider Department Center   6/6/2024  2:50 PM Francisco Nix, PT [facility]   6/11/2024 10:50 AM Francisco Nix, PT [facility]   6/13/2024 10:10 AM Rodrigo Carvalho, PT [facility]   6/18/2024  9:30 AM Rodrigo Carvalho, PT [facility]   6/20/2024 10:50 AM Rodrigo Carvalho, PT [facility]

## 2024-06-06 ENCOUNTER — APPOINTMENT (OUTPATIENT)
Facility: HOSPITAL | Age: 84
End: 2024-06-06
Payer: MEDICARE

## 2024-06-11 ENCOUNTER — HOSPITAL ENCOUNTER (OUTPATIENT)
Facility: HOSPITAL | Age: 84
Setting detail: RECURRING SERIES
Discharge: HOME | End: 2024-06-14
Payer: MEDICARE

## 2024-06-11 PROCEDURE — 97530 THERAPEUTIC ACTIVITIES: CPT

## 2024-06-11 PROCEDURE — 97112 NEUROMUSCULAR REEDUCATION: CPT

## 2024-06-11 PROCEDURE — 97110 THERAPEUTIC EXERCISES: CPT

## 2024-06-11 NOTE — PROGRESS NOTES
units; 53-67 min = 4 units; 68-82 min = 5 units   Total Total     TOTAL TREATMENT TIME:        56     [x]  Patient Education billed concurrently with other procedures   [x] Review HEP    [] Progressed/Changed HEP, detail:    [] Other detail:       Objective Information/Functional Measures/Assessment    Pt tolerated treatment fairly well though noted some pain with bridges terminating performance towards end of 2nd set. Progressed into sit to stands today noting some initial knee discomfort complaints that improved with repetition void of back pain provocation. Pt noted no change in symptoms post treatment.    Patient will continue to benefit from skilled PT / OT services to modify and progress therapeutic interventions, analyze and address functional mobility deficits, analyze and address ROM deficits, analyze and address strength deficits, analyze and address soft tissue restrictions, analyze and cue for proper movement patterns, and instruct in home and community integration to address functional deficits and attain remaining goals.    Progress toward goals / Updated goals:  []  See Progress Note/Recertification    Short Term Goals: To be accomplished in 4 weeks:                 Patient will report compliance with HEP 1x/day to aid in rehabilitation program.                 Status at IE: Patient instructed in and provided written copy of initial Home Exercise Program.                 Current: Pt reports partial HEP compliance. Educated Pt on importance of daily HEP compliance to gain max benefits from skilled PT 5/22/2024                    Patient will increase lumbar ROM to flexion fingertips 5 inches from floor and extension 20 degrees to aid in completion of ADLs.                 Status at IE: flexion fingertips 13 inches from floor, extension 11 degrees                 Current: fingertips 4 inches from floor, cues to avoid knee flexion, ext 14 degrees, improving 6/4/24     Long Term Goals: To be accomplished

## 2024-06-12 ENCOUNTER — TELEPHONE (OUTPATIENT)
Facility: HOSPITAL | Age: 84
End: 2024-06-12

## 2024-06-13 ENCOUNTER — HOSPITAL ENCOUNTER (OUTPATIENT)
Facility: HOSPITAL | Age: 84
Setting detail: RECURRING SERIES
Discharge: HOME | End: 2024-06-16
Payer: MEDICARE

## 2024-06-13 PROCEDURE — 97110 THERAPEUTIC EXERCISES: CPT

## 2024-06-13 PROCEDURE — 97530 THERAPEUTIC ACTIVITIES: CPT

## 2024-06-13 PROCEDURE — 97112 NEUROMUSCULAR REEDUCATION: CPT

## 2024-06-13 NOTE — PROGRESS NOTES
Physical Therapy Discharge Instructions    [facility]  [address] [address]  Phone: [phone]   Fax: [phone]      Patient: Christelle Chaudhary  : 1940    Continue Home Exercise Program 2 times per day for 4 weeks, then decrease to 5 times per week      Continue with    [x] Ice  as needed      [x] Heat           Follow up with MD:     [] Upon completion of therapy     [x] As needed      Recommendations:     [x]   Return to activity with home program    []   Return to activity with the following modifications:       []Post Rehab Program    []Join Independent aquatic program     []Return to/join local gym      Additional Comments: Please contact clinic at above phone number if you have any questions regarding Home Exercise Program or self care instructions.

## 2024-06-13 NOTE — PROGRESS NOTES
PHYSICAL / OCCUPATIONAL THERAPY - DAILY TREATMENT NOTE (updated )    Patient Name: Christelle Chaudhary    Date: 2024    : 1940  Insurance: Payor: MEDICARE / Plan: MEDICARE PART A AND B / Product Type: *No Product type* /      Patient  verified Yes     Visit #   Current / Total 11 16   Time   In / Out 1011 1104   Pain   In / Out 4 4   Subjective Functional Status/Changes: \"I am out of town next week and busy with medical appointments the following week, so I feel like I am ready to stop PT now and continue on my own.\"   Changes to:  Meds, Allergies, Med Hx, Sx Hx?  If yes, update Summary List no       TREATMENT AREA =  Other low back pain [M54.59]    OBJECTIVE    Therapeutic Procedures:  Tx Min Billable or 1:1 Min (if diff from Tx Min) Procedure, Rationale, Specifics   25  42246 Therapeutic Exercise (timed):  increase ROM, strength, coordination, balance, and proprioception to improve patient's ability to progress to PLOF and address remaining functional goals. (see flow sheet as applicable)     Details if applicable:       15  38021 Therapeutic Activity (timed):  use of dynamic activities replicating functional movements to increase ROM, strength, coordination, balance, and proprioception in order to improve patient's ability to progress to PLOF and address remaining functional goals.  (see flow sheet as applicable)     Details if applicable:     13  46282 Neuromuscular Re-Education (timed):  improve balance, coordination, kinesthetic sense, posture, core stability and proprioception to improve patient's ability to develop conscious control of individual muscles and awareness of position of extremities in order to progress to PLOF and address remaining functional goals. (see flow sheet as applicable)     Details if applicable:     53  [facility] Totals Reminder: bill using total billable min of TIMED therapeutic procedures (example: do not include dry needle or estim unattended, both untimed codes, in

## 2024-06-18 ENCOUNTER — APPOINTMENT (OUTPATIENT)
Facility: HOSPITAL | Age: 84
End: 2024-06-18
Payer: MEDICARE

## 2024-06-20 ENCOUNTER — APPOINTMENT (OUTPATIENT)
Facility: HOSPITAL | Age: 84
End: 2024-06-20
Payer: MEDICARE

## 2024-10-16 ENCOUNTER — ANESTHESIA EVENT (OUTPATIENT)
Facility: HOSPITAL | Age: 84
End: 2024-10-16
Payer: MEDICARE

## 2024-10-17 ENCOUNTER — HOSPITAL ENCOUNTER (OUTPATIENT)
Facility: HOSPITAL | Age: 84
Discharge: HOME | End: 2024-10-20

## 2024-10-17 DIAGNOSIS — Z01.818 PRE-OP TESTING: ICD-10-CM

## 2024-10-18 LAB
BACTERIA SPEC CULT: NORMAL
BACTERIA SPEC CULT: NORMAL
SERVICE CMNT-IMP: NORMAL

## 2024-11-01 PROBLEM — M16.12 PRIMARY OSTEOARTHRITIS OF LEFT HIP: Status: ACTIVE | Noted: 2024-11-01

## 2024-11-01 NOTE — H&P
History and Physical        Patient: Christelle Chaudhary               Sex: female          DOA: (Not on file)         YOB: 1940      Age:  84 y.o.        LOS:  LOS: 0 days        HPI:     Dilma is in for follow up of her left moderate patellofemoral joint DJD/probable medial meniscal tear, and left hip moderate coxarthrosis/proven intraarticular injection on 07/26/24.  The MRI came back and does shows some degeneration of the lateral meniscal root, but she is not having pain and is doing better. The left hip is really bothering her. She had an intra-articular injection on 07/26/24, which was helpful for a little bit.  She was having more pain in the left hip now and wants to consider surgery.      Past Medical History:   Diagnosis Date    Arthritis     Benign gastric ulcer 1985    Cerebral artery occlusion with cerebral infarction (HCC) 2012    History of blood transfusion 2022    Hx of blood clots 2013    DVT    Thyroid disease        Past Surgical History:   Procedure Laterality Date    APPENDECTOMY      1970's    CARPAL TUNNEL RELEASE Bilateral 1980    CATARACT EXTRACTION W/ INTRAOCULAR LENS IMPLANT Bilateral 2013    COLONOSCOPY  2012    ENDOSCOPY, COLON, DIAGNOSTIC  2012    ILEOSTOMY CLOSURE  2014    NASAL SINUS SURGERY      1980's    OVARIAN CYST REMOVAL      1970's    PARTIAL HIP ARTHROPLASTY Right 2022    TOTAL COLECTOMY  2013    J Pouch 2014       No family history on file.    Social History     Socioeconomic History    Marital status:    Tobacco Use    Smoking status: Never    Smokeless tobacco: Never   Vaping Use    Vaping status: Never Used   Substance and Sexual Activity    Alcohol use: Not Currently    Drug use: Never    Sexual activity: Not Currently       Prior to Admission medications    Medication Sig Start Date End Date Taking? Authorizing Provider   levothyroxine (SYNTHROID) 88 MCG tablet Take 1 tablet by mouth Daily    Provider, MD Jermanie   famotidine (PEPCID) 20

## 2024-11-06 ENCOUNTER — HOSPITAL ENCOUNTER (OUTPATIENT)
Facility: HOSPITAL | Age: 84
Setting detail: OUTPATIENT SURGERY
Discharge: HOME HEALTH | End: 2024-11-06
Attending: ORTHOPAEDIC SURGERY | Admitting: ORTHOPAEDIC SURGERY
Payer: MEDICARE

## 2024-11-06 ENCOUNTER — APPOINTMENT (OUTPATIENT)
Facility: HOSPITAL | Age: 84
End: 2024-11-06
Attending: ORTHOPAEDIC SURGERY
Payer: MEDICARE

## 2024-11-06 ENCOUNTER — ANESTHESIA (OUTPATIENT)
Facility: HOSPITAL | Age: 84
End: 2024-11-06
Payer: MEDICARE

## 2024-11-06 VITALS
WEIGHT: 115.3 LBS | RESPIRATION RATE: 15 BRPM | HEIGHT: 65 IN | BODY MASS INDEX: 19.21 KG/M2 | OXYGEN SATURATION: 100 % | SYSTOLIC BLOOD PRESSURE: 140 MMHG | HEART RATE: 108 BPM | DIASTOLIC BLOOD PRESSURE: 50 MMHG | TEMPERATURE: 97.5 F

## 2024-11-06 DIAGNOSIS — M16.12 PRIMARY OSTEOARTHRITIS OF LEFT HIP: Primary | ICD-10-CM

## 2024-11-06 LAB
ABO + RH BLD: NORMAL
BLOOD GROUP ANTIBODIES SERPL: NORMAL
SPECIMEN EXP DATE BLD: NORMAL

## 2024-11-06 PROCEDURE — 7100000010 HC PHASE II RECOVERY - FIRST 15 MIN: Performed by: ORTHOPAEDIC SURGERY

## 2024-11-06 PROCEDURE — 2580000003 HC RX 258: Performed by: PHYSICIAN ASSISTANT

## 2024-11-06 PROCEDURE — C1713 ANCHOR/SCREW BN/BN,TIS/BN: HCPCS | Performed by: ORTHOPAEDIC SURGERY

## 2024-11-06 PROCEDURE — 86901 BLOOD TYPING SEROLOGIC RH(D): CPT

## 2024-11-06 PROCEDURE — 7100000001 HC PACU RECOVERY - ADDTL 15 MIN: Performed by: ORTHOPAEDIC SURGERY

## 2024-11-06 PROCEDURE — 2709999900 HC NON-CHARGEABLE SUPPLY: Performed by: ORTHOPAEDIC SURGERY

## 2024-11-06 PROCEDURE — 6360000002 HC RX W HCPCS: Performed by: NURSE ANESTHETIST, CERTIFIED REGISTERED

## 2024-11-06 PROCEDURE — 3700000000 HC ANESTHESIA ATTENDED CARE: Performed by: ORTHOPAEDIC SURGERY

## 2024-11-06 PROCEDURE — C1776 JOINT DEVICE (IMPLANTABLE): HCPCS | Performed by: ORTHOPAEDIC SURGERY

## 2024-11-06 PROCEDURE — 3700000001 HC ADD 15 MINUTES (ANESTHESIA): Performed by: ORTHOPAEDIC SURGERY

## 2024-11-06 PROCEDURE — 97161 PT EVAL LOW COMPLEX 20 MIN: CPT

## 2024-11-06 PROCEDURE — 6360000002 HC RX W HCPCS: Performed by: PHYSICIAN ASSISTANT

## 2024-11-06 PROCEDURE — 7100000011 HC PHASE II RECOVERY - ADDTL 15 MIN: Performed by: ORTHOPAEDIC SURGERY

## 2024-11-06 PROCEDURE — 86850 RBC ANTIBODY SCREEN: CPT

## 2024-11-06 PROCEDURE — A4217 STERILE WATER/SALINE, 500 ML: HCPCS | Performed by: ORTHOPAEDIC SURGERY

## 2024-11-06 PROCEDURE — 86900 BLOOD TYPING SEROLOGIC ABO: CPT

## 2024-11-06 PROCEDURE — 36415 COLL VENOUS BLD VENIPUNCTURE: CPT

## 2024-11-06 PROCEDURE — 2500000003 HC RX 250 WO HCPCS: Performed by: NURSE ANESTHETIST, CERTIFIED REGISTERED

## 2024-11-06 PROCEDURE — 3600000015 HC SURGERY LEVEL 5 ADDTL 15MIN: Performed by: ORTHOPAEDIC SURGERY

## 2024-11-06 PROCEDURE — 97116 GAIT TRAINING THERAPY: CPT

## 2024-11-06 PROCEDURE — 3600000005 HC SURGERY LEVEL 5 BASE: Performed by: ORTHOPAEDIC SURGERY

## 2024-11-06 PROCEDURE — 2580000003 HC RX 258: Performed by: ORTHOPAEDIC SURGERY

## 2024-11-06 PROCEDURE — 97165 OT EVAL LOW COMPLEX 30 MIN: CPT

## 2024-11-06 PROCEDURE — 6370000000 HC RX 637 (ALT 250 FOR IP): Performed by: PHYSICIAN ASSISTANT

## 2024-11-06 PROCEDURE — 6370000000 HC RX 637 (ALT 250 FOR IP): Performed by: ORTHOPAEDIC SURGERY

## 2024-11-06 PROCEDURE — 6360000002 HC RX W HCPCS: Performed by: ORTHOPAEDIC SURGERY

## 2024-11-06 PROCEDURE — 7100000000 HC PACU RECOVERY - FIRST 15 MIN: Performed by: ORTHOPAEDIC SURGERY

## 2024-11-06 DEVICE — ACTIS DUOFIX HIP PROSTHESIS (FEMORAL STEM 12/14 TAPER CEMENTLESS SIZE 8 STD COLLAR)  CE
Type: IMPLANTABLE DEVICE | Site: HIP | Status: FUNCTIONAL
Brand: ACTIS

## 2024-11-06 DEVICE — LINER ACET OD48MM ID32MM NEUT OFFSET HIP ALTRX PINN: Type: IMPLANTABLE DEVICE | Site: HIP | Status: FUNCTIONAL

## 2024-11-06 DEVICE — CUP ACET DIA48MM HIP GRIPTION PRI PINN: Type: IMPLANTABLE DEVICE | Site: HIP | Status: FUNCTIONAL

## 2024-11-06 DEVICE — BIOLOX DELTA CERAMIC FEMORAL HEAD 32MM DIA +1 12/14 TAPER
Type: IMPLANTABLE DEVICE | Site: HIP | Status: FUNCTIONAL
Brand: BIOLOX DELTA

## 2024-11-06 RX ORDER — HYDROMORPHONE HYDROCHLORIDE 1 MG/ML
0.5 INJECTION, SOLUTION INTRAMUSCULAR; INTRAVENOUS; SUBCUTANEOUS EVERY 5 MIN PRN
Status: DISCONTINUED | OUTPATIENT
Start: 2024-11-06 | End: 2024-11-06 | Stop reason: HOSPADM

## 2024-11-06 RX ORDER — PHENYLEPHRINE HCL IN 0.9% NACL 1 MG/10 ML
SYRINGE (ML) INTRAVENOUS
Status: DISCONTINUED | OUTPATIENT
Start: 2024-11-06 | End: 2024-11-06 | Stop reason: SDUPTHER

## 2024-11-06 RX ORDER — MAGNESIUM HYDROXIDE 1200 MG/15ML
LIQUID ORAL CONTINUOUS PRN
Status: COMPLETED | OUTPATIENT
Start: 2024-11-06 | End: 2024-11-06

## 2024-11-06 RX ORDER — LIDOCAINE HYDROCHLORIDE 20 MG/ML
INJECTION, SOLUTION EPIDURAL; INFILTRATION; INTRACAUDAL; PERINEURAL
Status: DISCONTINUED | OUTPATIENT
Start: 2024-11-06 | End: 2024-11-06 | Stop reason: SDUPTHER

## 2024-11-06 RX ORDER — SODIUM CHLORIDE 0.9 % (FLUSH) 0.9 %
5-40 SYRINGE (ML) INJECTION EVERY 12 HOURS SCHEDULED
Status: DISCONTINUED | OUTPATIENT
Start: 2024-11-06 | End: 2024-11-06 | Stop reason: HOSPADM

## 2024-11-06 RX ORDER — SODIUM CHLORIDE 0.9 % (FLUSH) 0.9 %
5-40 SYRINGE (ML) INJECTION PRN
Status: DISCONTINUED | OUTPATIENT
Start: 2024-11-06 | End: 2024-11-06 | Stop reason: HOSPADM

## 2024-11-06 RX ORDER — SODIUM CHLORIDE, SODIUM LACTATE, POTASSIUM CHLORIDE, AND CALCIUM CHLORIDE .6; .31; .03; .02 G/100ML; G/100ML; G/100ML; G/100ML
1000 INJECTION, SOLUTION INTRAVENOUS ONCE
Status: DISCONTINUED | OUTPATIENT
Start: 2024-11-06 | End: 2024-11-06

## 2024-11-06 RX ORDER — OXYCODONE HYDROCHLORIDE 5 MG/1
5 TABLET ORAL PRN
Status: DISCONTINUED | OUTPATIENT
Start: 2024-11-06 | End: 2024-11-06 | Stop reason: HOSPADM

## 2024-11-06 RX ORDER — SODIUM CHLORIDE 9 MG/ML
INJECTION, SOLUTION INTRAVENOUS PRN
Status: DISCONTINUED | OUTPATIENT
Start: 2024-11-06 | End: 2024-11-06 | Stop reason: HOSPADM

## 2024-11-06 RX ORDER — PROPOFOL 10 MG/ML
INJECTION, EMULSION INTRAVENOUS
Status: DISCONTINUED | OUTPATIENT
Start: 2024-11-06 | End: 2024-11-06 | Stop reason: SDUPTHER

## 2024-11-06 RX ORDER — ONDANSETRON 2 MG/ML
INJECTION INTRAMUSCULAR; INTRAVENOUS
Status: DISCONTINUED | OUTPATIENT
Start: 2024-11-06 | End: 2024-11-06 | Stop reason: SDUPTHER

## 2024-11-06 RX ORDER — FENTANYL CITRATE 50 UG/ML
INJECTION, SOLUTION INTRAMUSCULAR; INTRAVENOUS
Status: DISCONTINUED | OUTPATIENT
Start: 2024-11-06 | End: 2024-11-06 | Stop reason: SDUPTHER

## 2024-11-06 RX ORDER — POVIDONE-IODINE 5 %
SOLUTION, NON-ORAL OPHTHALMIC (EYE) PRN
Status: DISCONTINUED | OUTPATIENT
Start: 2024-11-06 | End: 2024-11-06 | Stop reason: ALTCHOICE

## 2024-11-06 RX ORDER — NALOXONE HYDROCHLORIDE 0.4 MG/ML
INJECTION, SOLUTION INTRAMUSCULAR; INTRAVENOUS; SUBCUTANEOUS PRN
Status: DISCONTINUED | OUTPATIENT
Start: 2024-11-06 | End: 2024-11-06 | Stop reason: HOSPADM

## 2024-11-06 RX ORDER — OXYCODONE HYDROCHLORIDE 5 MG/1
10 TABLET ORAL PRN
Status: DISCONTINUED | OUTPATIENT
Start: 2024-11-06 | End: 2024-11-06 | Stop reason: HOSPADM

## 2024-11-06 RX ORDER — ACETAMINOPHEN 500 MG
1000 TABLET ORAL ONCE
Status: COMPLETED | OUTPATIENT
Start: 2024-11-06 | End: 2024-11-06

## 2024-11-06 RX ORDER — PROCHLORPERAZINE EDISYLATE 5 MG/ML
5 INJECTION INTRAMUSCULAR; INTRAVENOUS
Status: DISCONTINUED | OUTPATIENT
Start: 2024-11-06 | End: 2024-11-06 | Stop reason: HOSPADM

## 2024-11-06 RX ORDER — FENTANYL CITRATE 50 UG/ML
25 INJECTION, SOLUTION INTRAMUSCULAR; INTRAVENOUS EVERY 5 MIN PRN
Status: DISCONTINUED | OUTPATIENT
Start: 2024-11-06 | End: 2024-11-06 | Stop reason: HOSPADM

## 2024-11-06 RX ORDER — CEPHALEXIN 500 MG/1
500 CAPSULE ORAL 4 TIMES DAILY
Qty: 3 CAPSULE | Refills: 0
Start: 2024-11-06

## 2024-11-06 RX ORDER — TRANEXAMIC ACID 650 MG/1
1950 TABLET ORAL
Status: DISCONTINUED | OUTPATIENT
Start: 2024-11-06 | End: 2024-11-06 | Stop reason: HOSPADM

## 2024-11-06 RX ORDER — SODIUM CHLORIDE, SODIUM LACTATE, POTASSIUM CHLORIDE, CALCIUM CHLORIDE 600; 310; 30; 20 MG/100ML; MG/100ML; MG/100ML; MG/100ML
INJECTION, SOLUTION INTRAVENOUS CONTINUOUS
Status: DISCONTINUED | OUTPATIENT
Start: 2024-11-06 | End: 2024-11-06 | Stop reason: HOSPADM

## 2024-11-06 RX ORDER — IPRATROPIUM BROMIDE AND ALBUTEROL SULFATE 2.5; .5 MG/3ML; MG/3ML
1 SOLUTION RESPIRATORY (INHALATION)
Status: DISCONTINUED | OUTPATIENT
Start: 2024-11-06 | End: 2024-11-06 | Stop reason: HOSPADM

## 2024-11-06 RX ORDER — PANTOPRAZOLE SODIUM 40 MG/1
40 TABLET, DELAYED RELEASE ORAL ONCE
Status: COMPLETED | OUTPATIENT
Start: 2024-11-06 | End: 2024-11-06

## 2024-11-06 RX ORDER — DEXAMETHASONE SODIUM PHOSPHATE 4 MG/ML
8 INJECTION, SOLUTION INTRA-ARTICULAR; INTRALESIONAL; INTRAMUSCULAR; INTRAVENOUS; SOFT TISSUE ONCE
Status: COMPLETED | OUTPATIENT
Start: 2024-11-06 | End: 2024-11-06

## 2024-11-06 RX ORDER — HYDROCODONE BITARTRATE AND ACETAMINOPHEN 5; 325 MG/1; MG/1
1 TABLET ORAL EVERY 6 HOURS PRN
Qty: 30 TABLET | Refills: 0 | Status: SHIPPED | OUTPATIENT
Start: 2024-11-06 | End: 2024-11-13

## 2024-11-06 RX ADMIN — DEXAMETHASONE SODIUM PHOSPHATE 8 MG: 4 INJECTION INTRA-ARTICULAR; INTRALESIONAL; INTRAMUSCULAR; INTRAVENOUS; SOFT TISSUE at 09:00

## 2024-11-06 RX ADMIN — LIDOCAINE HYDROCHLORIDE 60 MG: 20 INJECTION, SOLUTION EPIDURAL; INFILTRATION; INTRACAUDAL; PERINEURAL at 10:17

## 2024-11-06 RX ADMIN — PANTOPRAZOLE SODIUM 40 MG: 40 TABLET, DELAYED RELEASE ORAL at 09:00

## 2024-11-06 RX ADMIN — ACETAMINOPHEN 1000 MG: 500 TABLET ORAL at 09:00

## 2024-11-06 RX ADMIN — ONDANSETRON 4 MG: 2 INJECTION INTRAMUSCULAR; INTRAVENOUS at 11:18

## 2024-11-06 RX ADMIN — Medication 100 MCG: at 11:20

## 2024-11-06 RX ADMIN — FENTANYL CITRATE 50 MCG: 50 INJECTION, SOLUTION INTRAMUSCULAR; INTRAVENOUS at 10:08

## 2024-11-06 RX ADMIN — FENTANYL CITRATE 50 MCG: 50 INJECTION, SOLUTION INTRAMUSCULAR; INTRAVENOUS at 10:44

## 2024-11-06 RX ADMIN — WATER 2000 MG: 1 INJECTION INTRAMUSCULAR; INTRAVENOUS; SUBCUTANEOUS at 10:24

## 2024-11-06 RX ADMIN — SODIUM CHLORIDE: 900 INJECTION, SOLUTION INTRAVENOUS at 11:43

## 2024-11-06 RX ADMIN — TRANEXAMIC ACID 1950 MG: 650 TABLET ORAL at 09:00

## 2024-11-06 RX ADMIN — PROPOFOL 120 MG: 10 INJECTION, EMULSION INTRAVENOUS at 10:17

## 2024-11-06 RX ADMIN — Medication 100 MCG: at 11:15

## 2024-11-06 RX ADMIN — HYDROMORPHONE HYDROCHLORIDE 1 MG: 1 INJECTION, SOLUTION INTRAMUSCULAR; INTRAVENOUS; SUBCUTANEOUS at 10:50

## 2024-11-06 RX ADMIN — SODIUM CHLORIDE: 900 INJECTION, SOLUTION INTRAVENOUS at 09:01

## 2024-11-06 ASSESSMENT — PAIN SCALES - GENERAL
PAINLEVEL_OUTOF10: 0
PAINLEVEL_OUTOF10: 4

## 2024-11-06 ASSESSMENT — PAIN - FUNCTIONAL ASSESSMENT
PAIN_FUNCTIONAL_ASSESSMENT: 0-10
PAIN_FUNCTIONAL_ASSESSMENT: ACTIVITIES ARE NOT PREVENTED
PAIN_FUNCTIONAL_ASSESSMENT: NONE - DENIES PAIN

## 2024-11-06 ASSESSMENT — PAIN DESCRIPTION - LOCATION: LOCATION: HIP

## 2024-11-06 ASSESSMENT — LIFESTYLE VARIABLES: SMOKING_STATUS: 0

## 2024-11-06 ASSESSMENT — PAIN DESCRIPTION - DESCRIPTORS: DESCRIPTORS: ACHING

## 2024-11-06 ASSESSMENT — PAIN DESCRIPTION - ORIENTATION: ORIENTATION: LEFT

## 2024-11-06 NOTE — PERIOP NOTE
Pt. Used restroom in pre-op area with assistance.   Patient placed on Lorena Paws for a minimum of 30 min in  Preop.

## 2024-11-06 NOTE — INTERVAL H&P NOTE
Update History & Physical    The patient's History and Physical of November 1, the surgery was reviewed with the patient and I examined the patient. There was no change. The surgical site was confirmed by the patient and me.     Plan: The risks, benefits, expected outcome, and alternative to the recommended procedure have been discussed with the patient. Patient understands and wants to proceed with the procedure.     Electronically signed by INDER DIXON III, MD on 11/6/2024 at 10:29 AM

## 2024-11-06 NOTE — ANESTHESIA POSTPROCEDURE EVALUATION
Department of Anesthesiology  Postprocedure Note    Patient: Christelle Chaudhary  MRN: [id number]  YOB: 1940  Date of evaluation: 11/6/2024    Procedure Summary       Date: 11/06/24 Room / [facility]: [facility] 10 / [facility]    Anesthesia Start: 1010 Anesthesia Stop: 1144    Procedure: LEFT TOTAL HIP ARTHROPLASTY ANTERIOR APPROACH WITH C-ARM (Left: Hip) Diagnosis:       Osteoarthritis of left hip, unspecified osteoarthritis type      (Osteoarthritis of left hip, unspecified osteoarthritis type [M16.12])    Surgeons: Omid Gaines III, MD Responsible Provider: Joaquin Akhtar MD    Anesthesia Type: General ASA Status: 3            Anesthesia Type: General    Andrea Phase I: Andrea Score: 10    Andrea Phase II: Andrea Score: 10    Anesthesia Post Evaluation    Patient location during evaluation: PACU  Patient participation: complete - patient participated  Level of consciousness: awake and alert  Pain score: 0  Airway patency: patent  Nausea & Vomiting: no nausea and no vomiting  Cardiovascular status: blood pressure returned to baseline  Respiratory status: acceptable  Hydration status: euvolemic  Multimodal analgesia pain management approach  Pain management: adequate    No notable events documented.

## 2024-11-06 NOTE — PERIOP NOTE
TRANSFER - IN REPORT:    Verbal report received from ORN & CRNA on Christelle Chaudhary  being received from OR for routine progression of patient care      Report consisted of patient's Situation, Background, Assessment and   Recommendations(SBAR).     Information from the following report(s) Adult Overview, Surgery Report, Intake/Output, and MAR was reviewed with the receiving nurse.    Opportunity for questions and clarification was provided.      Assessment completed upon patient's arrival to unit and care assumed.

## 2024-11-06 NOTE — OP NOTE
LEFT DIRECT ANTERIOR TOTAL HIP REPLACEMENT    Patient: Christelle Chaudhary MRN: [id number]  CSN: [id number]   YOB: 1940  Age: 84 y.o.  Sex: female      Date of Surgery:11/6/2024     PREOPERATIVE DIAGNOSES: Left hip osteoarthritis      POSTOPERATIVE DIAGNOSES:Left hip osteoarthritis    PROCEDURE: Left press fit direct anterior total hip arthroplasty.     IMPLANTS:   Implant Name Type Inv. Item Serial No.  Lot No. LRB No. Used Action   CUP ACET [id number] HIP GRIPTION LAUREL PINN - [id number]  CUP ACET [id number] HIP GRIPTION LAUREL PINN  [facility] [id number] Left 1 Implanted   LINER ACET OD48MM ID32MM NEUT OFFSET HIP ALTRX PINN - [id number]  LINER ACET OD48MM ID32MM NEUT OFFSET HIP ALTRX PINN  [facility] [id number] Left 1 Implanted   HEAD FEM [id number] +1MM OFFSET 12/14 TAPR HIP CERAMIC BIOLOX - [id number]  HEAD FEM [id number] +1MM OFFSET 12/14 TAPR HIP CERAMIC BIOLOX  [facility] [id number] Left 1 Implanted   STEM FEM SZ 8 L111MM 12/14 TAPR STD OFFSET HIP DUOFIX CLLRD - [id number]  STEM FEM SZ 8 L111MM 12/14 TAPR STD OFFSET HIP DUOFIX CLLRD  [facility] [id number] Left 1 Implanted       SURGEON: INDER DIXON III, MD    FIRST ASSISTANT: Christopher Coleman PA-C    SECOND ASSISTANT: Scrub Person First: Anna Quintero Pamela  Scrub Person Second: Rosa Ramirez  Physician Assistant: Christopher Coleman PA-C    ANESTHESIA: Other    SPECIMENS TO PATHOLOGY: * No specimens in log *    ESTIMATED BLOOD LOSS: 75cc    FINDINGS: Same    COMPLICATIONS: None    INDICATIONS:  A 84 y.o.  White (non-) female with severe coxarthrosis documented by clinical exam and x-ray.  The patient has bone-on-bone arthritis by x-rays and has failed all conservative interventions including medications, weight loss, physical therapy, relative rest, ambulatory aids and injections.  The patient now presents for a left total hip arthroplasty

## 2024-11-06 NOTE — ANESTHESIA PRE PROCEDURE
Department of Anesthesiology  Preprocedure Note       Name:  Christelle Chaudhary   Age:  84 y.o.  :  1940                                          MRN:  [id number]         Date:  2024      Surgeon: Surgeon(s):  Omid Gaines III, MD    Procedure: Procedure(s):  LEFT TOTAL HIP ARTHROPLASTY ANTERIOR APPROACH WITH C-ARM    Medications prior to admission:   Prior to Admission medications    Medication Sig Start Date End Date Taking? Authorizing Provider   levothyroxine (SYNTHROID) 88 MCG tablet Take 1 tablet by mouth Daily   Yes Jermaine Cortes MD   famotidine (PEPCID) 20 MG tablet Take 1 tablet by mouth nightly   Yes Jermaine Cortes MD   acetaminophen (TYLENOL) 500 MG tablet Take 1 tablet by mouth every 6 hours as needed for Pain   Yes Jermaine Cortes MD   carboxymethylcellulose 1 % ophthalmic solution Place 1 drop into both eyes 3 times daily as needed for Dry Eyes   Yes Jermaine Cortes MD   aspirin 81 MG EC tablet Take 1 tablet by mouth nightly    Jermaine Cortes MD   Multiple Vitamins-Minerals (CENTRUM SILVER) TABS Take 1 tablet by mouth daily    Jermaine Cortes MD   Ferrous Sulfate Dried (FP SLOW RELEASE IRON PO) Take 137 mg by mouth daily    Jermaine Cortes MD   Vitamin D3 125 MCG (5000 UT) TABS tablet Take 1 tablet by mouth daily    Jermaine Cortes MD   mineral oil-hydrophilic petrolatum (AQUAPHOR) ointment Apply topically as needed for Dry Skin Apply topically as needed.    Jermaine Cortes MD   therapeutic (THERA-DERM) LOTN Apply topically as needed    Jermaine Cortes MD       Current medications:    Current Facility-Administered Medications   Medication Dose Route Frequency Provider Last Rate Last Admin    tranexamic acid (LYSTEDA) tablet 1,950 mg  1,950 mg Oral 60 Min Pre-Op Christopher Coleman PA-C   1,950 mg at 24 0900    lactated ringers infusion   IntraVENous Continuous Christopher Coleman PA-C        sodium chloride flush

## 2024-11-06 NOTE — PERIOP NOTE
TRANSFER - IN REPORT:    Verbal report received from Brooke perdue on Christelle Chaudhary  being received from pacu for routine post-op      Report consisted of patient's Situation, Background, Assessment and   Recommendations(SBAR).     Information from the following report(s) Nurse Handoff Report was reviewed with the receiving nurse.    Opportunity for questions and clarification was provided.      Assessment completed upon patient's arrival to unit and care assumed.

## 2024-11-06 NOTE — DISCHARGE INSTRUCTIONS
have any problems with your medicine.  To prevent dehydration, drink plenty of fluids. Choose water and other clear liquids until you feel better. If you have kidney, heart, or liver disease and have to limit fluids, talk with your doctor before you increase the amount of fluids you drink.  When you are able to eat, try clear soups, mild foods, and liquids until all symptoms are gone for 12 to 48 hours. Other good choices include dry toast, crackers, cooked cereal, and gelatin dessert, such as Jell-O.  Do not smoke. Smoking and being around smoke can make nausea worse.   When should you call for help?    Call your doctor now or seek immediate medical care if:    You have new or worse nausea or vomiting.     You are too sick to your stomach to drink any fluids.     You cannot keep down fluids.     You have symptoms of dehydration, such as:  Dry eyes and a dry mouth.  Passing only a little urine.  Feeling thirstier than usual.     You are dizzy or lightheaded, or you feel like you may faint.           Infection After Surgery: Care Instructions  Overview  After surgery, an infection is always possible. It doesn't mean that the surgery didn't go well.  How can you care for yourself at home?  Make sure your surgeon knows about the infection, especially if you saw another doctor about your symptoms.  If your doctor prescribed antibiotics, take them as directed. Do not stop taking them just because you feel better. You need to take the full course of antibiotics.  Keep the skin clean and dry.  You may have a bandage over the cut (incision). A bandage helps the incision heal and protects it. Your doctor will tell you how to take care of this. Keep it clean and dry. You may have drainage from the wound.     Call your doctor now or seek immediate medical care if:    You have signs of an infection such as:  Increased pain, swelling, warmth, or redness in the area.  Red streaks leading from the area.  Pus draining from the

## 2024-11-06 NOTE — PERIOP NOTE
TRANSFER - OUT REPORT:    Verbal report given to Marquita WOOTEN  on Christelle Chaudhary  being transferred to Phase II  for routine progression of patient care       Report consisted of patient's Situation, Background, Assessment and   Recommendations(SBAR).     Information from the following report(s) Adult Overview, Surgery Report, Intake/Output, and MAR was reviewed with the receiving nurse.           Lines:   Peripheral IV 11/06/24 Left;Proximal Forearm (Active)   Site Assessment Clean, dry & intact 11/06/24 1145   Line Status Infusing;Flushed 11/06/24 1145   Line Care Connections checked and tightened 11/06/24 1145   Phlebitis Assessment No symptoms 11/06/24 1145   Infiltration Assessment 0 11/06/24 1145   Alcohol Cap Used No 11/06/24 0904   Dressing Status Clean, dry & intact 11/06/24 1145   Dressing Type Transparent 11/06/24 1145   Dressing Intervention New 11/06/24 0904        Opportunity for questions and clarification was provided.      Patient transported with:  Registered Nurse

## 2024-11-06 NOTE — PROGRESS NOTES
Benign gastric ulcer 1985    Cerebral artery occlusion with cerebral infarction (HCC) 2012    History of blood transfusion 2022    Hx of blood clots 2013    DVT    Thyroid disease      Past Surgical History:   Procedure Laterality Date    APPENDECTOMY      1970's    CARPAL TUNNEL RELEASE Bilateral 1980    CATARACT EXTRACTION W/ INTRAOCULAR LENS IMPLANT Bilateral 2013    COLONOSCOPY  2012    ENDOSCOPY, COLON, DIAGNOSTIC  2012    ILEOSTOMY CLOSURE  2014    NASAL SINUS SURGERY      1980's    OVARIAN CYST REMOVAL      1970's    PARTIAL HIP ARTHROPLASTY Right 2022    TOTAL COLECTOMY  2013    J Pouch 2014       Home Situation:  Social/Functional History  Lives With: Spouse  Type of Home: House  Home Layout: One level  Home Access: Stairs to enter with rails  Entrance Stairs - Number of Steps: 3  Entrance Stairs - Rails: Left  Bathroom Shower/Tub: Walk-in shower  Bathroom Toilet: Standard  Bathroom Equipment: Shower chair  Home Equipment: Walker - Rolling, Cane (triwheeled walker)  Critical Behavior:  Orientation  Orientation Level: Oriented to place;Oriented to situation;Oriented to person  Cognition  Overall Cognitive Status: WFL  Strength:    Strength: Generally decreased, functional  Tone & Sensation:   Tone: Normal  Sensation: Impaired (L hip)  Range Of Motion:  AROM: Generally decreased, functional  PROM: Generally decreased, functional  Functional Mobility:  Bed Mobility:  Bed Mobility Training  Bed Mobility Training: No  Transfers:  Transfer Training  Transfer Training: Yes  Interventions: Safety awareness training;Verbal cues;Tactile cues  Sit to Stand: Contact-guard assistance  Stand to Sit: Contact-guard assistance  Balance:   Balance  Sitting: Intact  Standing: Impaired  Standing - Static: Fair;Constant support;Occasional  Standing - Dynamic: Fair;Occasional;Constant support  Wheelchair Mobility:  Ambulation/Gait Training:  Gait  Gait Training: Yes  Left Side Weight Bearing: As tolerated  Overall Level of 
4   5. Taking care of personal grooming such as brushing teeth?   [] 1 [] 2 [x] 3 [] 4   6. Eating meals?   [] 1 [] 2 [] 3 [x] 4       Current research shows that an AM-PAC score of 18 or greater is associated with a discharge to the patient's home setting.  Based on an AM-PAC score of 19/24 and their current ADL deficits; it is recommended that the patient have 2-3 sessions per week of Occupational Therapy at d/c to increase the patient's independence.

## 2024-11-06 NOTE — PERIOP NOTE
Face to face Discharged  instruction given to family and agreed with the plan. Discharged includes diet, activity limitations , medication to continue and f/u with in 14 appointment. Will D/c to home in stable condition with care of family.

## 2024-11-22 ENCOUNTER — TELEPHONE (OUTPATIENT)
Facility: HOSPITAL | Age: 84
End: 2024-11-22

## 2024-11-26 ENCOUNTER — HOSPITAL ENCOUNTER (OUTPATIENT)
Facility: HOSPITAL | Age: 84
Setting detail: RECURRING SERIES
Discharge: HOME | End: 2024-11-29
Payer: MEDICARE

## 2024-11-26 PROCEDURE — 97161 PT EVAL LOW COMPLEX 20 MIN: CPT

## 2024-11-26 PROCEDURE — 97110 THERAPEUTIC EXERCISES: CPT

## 2024-11-26 NOTE — PROGRESS NOTES
[facility]  [address] [address]  Phone: [phone]   Fax: [phone]    Plan of Care/ Statement of Necessity for Physical Therapy Services        Patient name: Christelle Chaudhary Start of Care: 2024   Referral source: Omid Gaines III, MD : 1940    Medical Diagnosis: Pain in left hip [M25.552]      Onset Date: 24    Treatment Diagnosis:  M25.552  LEFT HIP PAIN                                           Prior Hospitalization: see medical history Provider#: [id number]   Medications: Verified on Patient Summary List     Comorbidities: hypothyroid, right JUNIOR  Prior Level of Function: ambulatory community distances without device.       The Plan of Care and following information is based on the information from the initial evaluation.  Assessment/ key information: Patient presents s/p left anterior JUNIOR performed 24. Patient reports moderate swelling anterior left hip with moderate to severe pain with AROM and weight bearing activities. Patient also demonstrates decreased left hip AROM and strength with moderate difficulty with sit to stand transfers, antalgic gait pattern and moderate difficulty with self care and household ADLs.    Patient will continue to benefit from skilled PT services to modify and progress therapeutic interventions, address functional mobility deficits, address ROM deficits, address strength deficits, analyze and address soft tissue restrictions, analyze and cue movement patterns, analyze and modify body mechanics/ergonomics and assess and modify postural abnormalities to attain remaining goals.    Evaluation Complexity HistoryMEDIUM  Complexity : 1-2 comorbidities / personal factors will impact the outcome/ POC  ; Examination LOW Complexity : 1-2 Standardized tests and measures addressing body structure, function, activity limitation and / or participation in recreation  ;Presentation LOW Complexity : Stable, uncomplicated 
tissue restrictions, analyze and cue movement patterns, analyze and modify body mechanics/ergonomics and assess and modify postural abnormalities to attain remaining goals.     [x]  See Plan of Care  []  See progress note/recertification  []  See Discharge Summary         Progress towards goals / Updated goals:  See POC    PLAN  []  Upgrade activities as tolerated     [x]  Continue plan of care  []  Update interventions per flow sheet       []  Discharge due to:_  []  Other:_      Rodrigo Carvalho, PT 11/26/2024  12:12 PM

## 2024-12-04 ENCOUNTER — TELEPHONE (OUTPATIENT)
Facility: HOSPITAL | Age: 84
End: 2024-12-04

## 2024-12-04 ENCOUNTER — HOSPITAL ENCOUNTER (OUTPATIENT)
Facility: HOSPITAL | Age: 84
Setting detail: RECURRING SERIES
End: 2024-12-04
Payer: MEDICARE

## 2024-12-04 NOTE — TELEPHONE ENCOUNTER
LVM to check in as it was 5 min after appt & reminded about 12/6 appt @ 2:50. Provided # to contact to RS.

## 2024-12-04 NOTE — TELEPHONE ENCOUNTER
cx Fri & 12/10 appts but pt didn't see PCP during appt for hip today but was ordered by fill-in MD to not go to PT on Fri & the next appt w/ PCP conflicts w/ 12/10 appt. WCB to update on what the pt will do.

## 2024-12-06 ENCOUNTER — APPOINTMENT (OUTPATIENT)
Facility: HOSPITAL | Age: 84
End: 2024-12-06
Payer: MEDICARE

## 2024-12-10 ENCOUNTER — APPOINTMENT (OUTPATIENT)
Facility: HOSPITAL | Age: 84
End: 2024-12-10
Payer: MEDICARE

## 2024-12-12 ENCOUNTER — HOSPITAL ENCOUNTER (OUTPATIENT)
Facility: HOSPITAL | Age: 84
Setting detail: RECURRING SERIES
Discharge: HOME | End: 2024-12-15
Payer: MEDICARE

## 2024-12-12 PROCEDURE — 97112 NEUROMUSCULAR REEDUCATION: CPT

## 2024-12-12 PROCEDURE — 97110 THERAPEUTIC EXERCISES: CPT

## 2024-12-12 PROCEDURE — 97530 THERAPEUTIC ACTIVITIES: CPT

## 2024-12-12 NOTE — PROGRESS NOTES
PHYSICAL / OCCUPATIONAL THERAPY - DAILY TREATMENT NOTE     Patient Name: Christelle Chaudhary    Date: 2024    : 1940  Insurance: Payor: MEDICARE / Plan: MEDICARE PART A AND B / Product Type: *No Product type* /      Patient  verified Yes     Visit #   Current / Total 2 24   Time   In / Out 2:10 pm 2:50 pm   Pain   In / Out 6-7 5-6   Subjective Functional Status/Changes: Pt reports she followed up with her orthopedic last week but does not remember much from the appt. States she has been having clunking sensations randomly in her hip once or twice a day that are painful when moving around.    Changes to:  Allergies, Med Hx, Sx Hx?   no       TREATMENT AREA =  Pain in left hip [M25.552]    If an interpreting service is utilized for treatment of this patient, the contents of this document represent the material reviewed with the patient via the .     OBJECTIVE    Therapeutic Procedures:  Tx Min Billable or 1:1 Min (if diff from Tx Min) Procedure, Rationale, Specifics   20  42091 Therapeutic Exercise (timed):  increase ROM, strength, coordination, balance, and proprioception to improve patient's ability to progress to PLOF and address remaining functional goals. (see flow sheet as applicable)    Details if applicable:       10  56010 Neuromuscular Re-Education (timed):  improve balance, coordination, kinesthetic sense, posture, core stability and proprioception to improve patient's ability to develop conscious control of individual muscles and awareness of position of extremities in order to progress to PLOF and address remaining functional goals. (see flow sheet as applicable)    Details if applicable:     10  89126 Therapeutic Activity (timed):  use of dynamic activities replicating functional movements to increase ROM, strength, coordination, balance, and proprioception in order to improve patient's ability to progress to PLOF and address remaining functional goals.  (see flow sheet as

## 2024-12-16 ENCOUNTER — HOSPITAL ENCOUNTER (OUTPATIENT)
Facility: HOSPITAL | Age: 84
Setting detail: RECURRING SERIES
Discharge: HOME | End: 2024-12-19
Payer: MEDICARE

## 2024-12-16 PROCEDURE — 97112 NEUROMUSCULAR REEDUCATION: CPT

## 2024-12-16 PROCEDURE — 97110 THERAPEUTIC EXERCISES: CPT

## 2024-12-16 PROCEDURE — 97530 THERAPEUTIC ACTIVITIES: CPT

## 2024-12-16 NOTE — PROGRESS NOTES
PHYSICAL / OCCUPATIONAL THERAPY - DAILY TREATMENT NOTE     Patient Name: Christelle Chaudhary    Date: 2024    : 1940  Insurance: Payor: MEDICARE / Plan: MEDICARE PART A AND B / Product Type: *No Product type* /      Patient  verified Yes     Visit #   Current / Total 3 24   Time   In / Out 2:10 pm 2:57 pm   Pain   In / Out 4 2   Subjective Functional Status/Changes: \"Its doing better today.\" Pt notes she ha not had as much clunking sensations in her left hip.   Changes to:  Allergies, Med Hx, Sx Hx?   no       TREATMENT AREA =  Pain in left hip [M25.552]    If an interpreting service is utilized for treatment of this patient, the contents of this document represent the material reviewed with the patient via the .     OBJECTIVE    Therapeutic Procedures:  Tx Min Billable or 1:1 Min (if diff from Tx Min) Procedure, Rationale, Specifics   25  43464 Therapeutic Exercise (timed):  increase ROM, strength, coordination, balance, and proprioception to improve patient's ability to progress to PLOF and address remaining functional goals. (see flow sheet as applicable)    Details if applicable:       10  95975 Neuromuscular Re-Education (timed):  improve balance, coordination, kinesthetic sense, posture, core stability and proprioception to improve patient's ability to develop conscious control of individual muscles and awareness of position of extremities in order to progress to PLOF and address remaining functional goals. (see flow sheet as applicable)    Details if applicable:     12  68509 Therapeutic Activity (timed):  use of dynamic activities replicating functional movements to increase ROM, strength, coordination, balance, and proprioception in order to improve patient's ability to progress to PLOF and address remaining functional goals.  (see flow sheet as applicable)     Details if applicable:           Details if applicable:            Details if applicable:     47 47 [facility] Totals Reminder:

## 2024-12-18 ENCOUNTER — HOSPITAL ENCOUNTER (OUTPATIENT)
Facility: HOSPITAL | Age: 84
Setting detail: RECURRING SERIES
Discharge: HOME | End: 2024-12-21
Payer: MEDICARE

## 2024-12-18 PROCEDURE — 97112 NEUROMUSCULAR REEDUCATION: CPT

## 2024-12-18 PROCEDURE — 97530 THERAPEUTIC ACTIVITIES: CPT

## 2024-12-18 PROCEDURE — 97110 THERAPEUTIC EXERCISES: CPT

## 2024-12-18 NOTE — PROGRESS NOTES
PHYSICAL / OCCUPATIONAL THERAPY - DAILY TREATMENT NOTE (updated )    Patient Name: Christelle Chaudhary    Date: 2024    : 1940  Insurance: Payor: MEDICARE / Plan: MEDICARE PART A AND B / Product Type: *No Product type* /      Patient  verified Yes     Visit #   Current / Total 4 24   Time   In / Out 1449 1531   Pain   In / Out 5 4   Subjective Functional Status/Changes: \"I was on my feet a bit more recently and the hip is more sore.\"   Changes to:  Meds, Allergies, Med Hx, Sx Hx?  If yes, update Summary List no       TREATMENT AREA =  Pain in left hip [M25.552]    If an interpreting service is utilized for treatment of this patient, the contents of this document represent the material reviewed with the patient via the .     OBJECTIVE    Therapeutic Procedures:  Tx Min Billable or 1:1 Min (if diff from Tx Min) Procedure, Rationale, Specifics   15  93846 Therapeutic Exercise (timed):  increase ROM, strength, coordination, balance, and proprioception to improve patient's ability to progress to PLOF and address remaining functional goals. (see flow sheet as applicable)     Details if applicable:       15  81919 Therapeutic Activity (timed):  use of dynamic activities replicating functional movements to increase ROM, strength, coordination, balance, and proprioception in order to improve patient's ability to progress to PLOF and address remaining functional goals.  (see flow sheet as applicable)     Details if applicable:     12  82593 Neuromuscular Re-Education (timed):  improve balance, coordination, kinesthetic sense, posture, core stability and proprioception to improve patient's ability to develop conscious control of individual muscles and awareness of position of extremities in order to progress to PLOF and address remaining functional goals. (see flow sheet as applicable)     Details if applicable:     42  [facility] Totals Reminder: bill using total billable min of TIMED therapeutic

## 2024-12-24 ENCOUNTER — APPOINTMENT (OUTPATIENT)
Facility: HOSPITAL | Age: 84
End: 2024-12-24
Payer: MEDICARE

## 2024-12-26 ENCOUNTER — HOSPITAL ENCOUNTER (OUTPATIENT)
Facility: HOSPITAL | Age: 84
Setting detail: RECURRING SERIES
Discharge: HOME | End: 2024-12-29
Payer: MEDICARE

## 2024-12-26 PROCEDURE — 97112 NEUROMUSCULAR REEDUCATION: CPT

## 2024-12-26 PROCEDURE — 97016 VASOPNEUMATIC DEVICE THERAPY: CPT

## 2024-12-26 PROCEDURE — 97530 THERAPEUTIC ACTIVITIES: CPT

## 2024-12-26 PROCEDURE — 97110 THERAPEUTIC EXERCISES: CPT

## 2024-12-26 NOTE — PROGRESS NOTES
12/26/24                    Patient will decrease TUG by 20  seconds to demonstrate increased safety in mobility.                 Status at IE: 31.7 seconds with cane.                 Current: 21.9 seconds with cane  12/26/24                    Patient will improve 30 second sit to stand score to 11 repetitions to demonstrate increased proximal bilateral LE strength and associated increased safety in mobility.                 Status at IE: 6 repetitions                 Current: 9 repetitions  12/26/24                    Patient will improve Lower Extremity Functional Scale score by >20 points to demonstrate improvement in functional ability.                 Status at IE:19/80                 Current: 49/80 Met, 12/26/24    Summary of Care/ Key Functional Changes:   Patient has been well motivated, consistent and diligent with PT and HEP and as a result, is making gradual progress towards goals. Left hip AROM and strength are improving and functional activity tolerance is significantly improved as demonstrated in meeting Lower Extremity Functional Scale score goal. However, patient continues to report moderate pain intensity left hip. Patient states \"I haven't let up doing my usual busy activities since the surgery\". 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 PT is encouraging patient to limit FWB activity at home and permit affected muscles appropriate opportunity to recover.       ASSESSMENT/RECOMMENDATIONS:   Patient would benefit from the continuation of skilled rehab interventions for functional progress to achieving above stated clinically significant goals. Continue per plan of care.     Thank you for this referral.   Rodrigo Carvalho, PT 12/26/2024 12:25 PM    
        Patient will improve Lower Extremity Functional Scale score by >20 points to demonstrate improvement in functional ability.                 Status at IE:19/80                 Current: 49/80 Met, 12/26/24    PLAN  Yes  Continue plan of care  []  Upgrade activities as tolerated  []  Discharge due to:   []  Other:    Rodrigo Carvalho PT    12/26/2024    11:32 AM    Future Appointments   Date Time Provider Department Center   12/31/2024  4:10 PM Rodrigo Carvalho, PT [facility]   1/3/2025 10:50 AM Rodrigo Carvalho, PT [facility]   1/6/2025 12:10 PM Francisco Nix, PT [facility]   1/8/2025  1:30 PM Rodrigo Carvalho, PT [facility]   1/13/2025 11:30 AM Francisco Nix, PT [facility]   1/17/2025  2:10 PM Rodrigo Carvalho, PT [facility]   1/20/2025 11:30 AM Francisco Nix, PT [facility]   1/22/2025  2:10 PM Mac Chappell, PT [facility]   1/27/2025 11:30 AM Francisco Nix, PT [facility]   1/29/2025  1:30 PM Rodrigo Carvalho, PT [facility]

## 2024-12-31 ENCOUNTER — HOSPITAL ENCOUNTER (OUTPATIENT)
Facility: HOSPITAL | Age: 84
Setting detail: RECURRING SERIES
Discharge: HOME | End: 2025-01-03
Payer: MEDICARE

## 2024-12-31 PROCEDURE — 97112 NEUROMUSCULAR REEDUCATION: CPT

## 2024-12-31 PROCEDURE — 97110 THERAPEUTIC EXERCISES: CPT

## 2024-12-31 PROCEDURE — 97530 THERAPEUTIC ACTIVITIES: CPT

## 2024-12-31 NOTE — PROGRESS NOTES
accomplished in 8 weeks:                 Patient will increase Kunal LE strength to 5/5 MMT throughout to aid in completion of ADLs.                 Status at IE:left hip 3-/5                 Current: left hip 3+/5              12/26/24                    Patient will report pain no greater than 1-2/10 throughout entire day to aid in completion of ADLs                 Status at IE:4-6/10                 Current: 3-5/10        12/26/24                    Patient will decrease TUG by 20  seconds to demonstrate increased safety in mobility.                 Status at IE: 31.7 seconds with cane.                 Current: 21.9 seconds with cane     12/26/24                    Patient will improve 30 second sit to stand score to 11 repetitions to demonstrate increased proximal bilateral LE strength and associated increased safety in mobility.                 Status at IE: 6 repetitions                 Current: 9 repetitions           12/26/24                    Patient will improve Lower Extremity Functional Scale score by >20 points to demonstrate improvement in functional ability.                 Status at IE:19/80                 Current: 49/80          Met, 12/26/24    PLAN  Yes  Continue plan of care  []  Upgrade activities as tolerated  []  Discharge due to:   []  Other:    Rodrigo Carvalho PT    12/31/2024    4:30 PM    Future Appointments   Date Time Provider Department Center   1/3/2025 10:50 AM Rodrigo Carvalho, PT [facility]   1/6/2025 12:10 PM Francisco Nix, PT [facility]   1/8/2025  1:30 PM Rodrigo Carvalho, PT [facility]   1/13/2025 11:30 AM Francisco Nix, PT [facility]   1/17/2025  2:10 PM Rodrigo Carvalho, PT [facility]   1/20/2025 11:30 AM Francisco Nix, PT [facility]   1/22/2025  2:10 PM Mac Chappell, PT [facility]   1/27/2025 11:30 AM Francisco Nix, PT [facility]   1/29/2025  1:30 PM Rodrigo Carvalho, PT [facility]

## 2025-01-03 ENCOUNTER — HOSPITAL ENCOUNTER (OUTPATIENT)
Facility: HOSPITAL | Age: 85
Setting detail: RECURRING SERIES
Discharge: HOME | End: 2025-01-06
Payer: MEDICARE

## 2025-01-03 PROCEDURE — 97110 THERAPEUTIC EXERCISES: CPT

## 2025-01-03 PROCEDURE — 97112 NEUROMUSCULAR REEDUCATION: CPT

## 2025-01-03 PROCEDURE — 97530 THERAPEUTIC ACTIVITIES: CPT

## 2025-01-03 NOTE — PROGRESS NOTES
PHYSICAL / OCCUPATIONAL THERAPY - DAILY TREATMENT NOTE (updated )    Patient Name: Christelle Chaudhary    Date: 1/3/2025    : 1940  Insurance: Payor: MEDICARE / Plan: MEDICARE PART A AND B / Product Type: *No Product type* /      Patient  verified Yes     Visit #   Current / Total 7 24   Time   In / Out 1052 1142   Pain   In / Out 6 5   Subjective Functional Status/Changes: \"The hip is still very tender at the front. I haven't been taking it easy like you have advised me to do. But, I am going to try now that holidays are done to spend less time on my feet.\"   Changes to:  Meds, Allergies, Med Hx, Sx Hx?  If yes, update Summary List no       TREATMENT AREA =  Pain in left hip [M25.552]    If an interpreting service is utilized for treatment of this patient, the contents of this document represent the material reviewed with the patient via the .     OBJECTIVE    Therapeutic Procedures:  Tx Min Billable or 1:1 Min (if diff from Tx Min) Procedure, Rationale, Specifics   20 15 04435 Therapeutic Exercise (timed):  increase ROM, strength, coordination, balance, and proprioception to improve patient's ability to progress to PLOF and address remaining functional goals. (see flow sheet as applicable)     Details if applicable:       15  48912 Therapeutic Activity (timed):  use of dynamic activities replicating functional movements to increase ROM, strength, coordination, balance, and proprioception in order to improve patient's ability to progress to PLOF and address remaining functional goals.  (see flow sheet as applicable)     Details if applicable:     15  55014 Neuromuscular Re-Education (timed):  improve balance, coordination, kinesthetic sense, posture, core stability and proprioception to improve patient's ability to develop conscious control of individual muscles and awareness of position of extremities in order to progress to PLOF and address remaining functional goals. (see flow sheet as

## 2025-01-06 ENCOUNTER — HOSPITAL ENCOUNTER (OUTPATIENT)
Facility: HOSPITAL | Age: 85
Setting detail: RECURRING SERIES
Discharge: HOME | End: 2025-01-09
Payer: MEDICARE

## 2025-01-06 PROCEDURE — 97112 NEUROMUSCULAR REEDUCATION: CPT

## 2025-01-06 PROCEDURE — 97110 THERAPEUTIC EXERCISES: CPT

## 2025-01-06 PROCEDURE — 97530 THERAPEUTIC ACTIVITIES: CPT

## 2025-01-06 NOTE — PROGRESS NOTES
PHYSICAL / OCCUPATIONAL THERAPY - DAILY TREATMENT NOTE (updated )    Patient Name: Christelle Chaudhary    Date: 2025    : 1940  Insurance: Payor: MEDICARE / Plan: MEDICARE PART A AND B / Product Type: *No Product type* /      Patient  verified Yes     Visit #   Current / Total 8 24   Time   In / Out 12:10 pm 12:50 pm   Pain   In / Out 3 3   Subjective Functional Status/Changes: Pt reports her hip has calmed down some since last week.   Changes to:  Meds, Allergies, Med Hx, Sx Hx?  If yes, update Summary List no       TREATMENT AREA =  Pain in left hip [M25.552]    If an interpreting service is utilized for treatment of this patient, the contents of this document represent the material reviewed with the patient via the .     OBJECTIVE    Therapeutic Procedures:  Tx Min Billable or 1:1 Min (if diff from Tx Min) Procedure, Rationale, Specifics   10 10 13855 Therapeutic Exercise (timed):  increase ROM, strength, coordination, balance, and proprioception to improve patient's ability to progress to PLOF and address remaining functional goals. (see flow sheet as applicable)     Details if applicable:       15  44038 Therapeutic Activity (timed):  use of dynamic activities replicating functional movements to increase ROM, strength, coordination, balance, and proprioception in order to improve patient's ability to progress to PLOF and address remaining functional goals.  (see flow sheet as applicable)     Details if applicable:     15 15 70976 Neuromuscular Re-Education (timed):  improve balance, coordination, kinesthetic sense, posture, core stability and proprioception to improve patient's ability to develop conscious control of individual muscles and awareness of position of extremities in order to progress to PLOF and address remaining functional goals. (see flow sheet as applicable)     Details if applicable:     40 38 [facility] Totals Reminder: bill using total billable min of TIMED therapeutic

## 2025-01-08 ENCOUNTER — HOSPITAL ENCOUNTER (OUTPATIENT)
Facility: HOSPITAL | Age: 85
Setting detail: RECURRING SERIES
Discharge: HOME | End: 2025-01-11
Payer: MEDICARE

## 2025-01-08 PROCEDURE — 97530 THERAPEUTIC ACTIVITIES: CPT

## 2025-01-08 PROCEDURE — 97110 THERAPEUTIC EXERCISES: CPT

## 2025-01-08 PROCEDURE — 97112 NEUROMUSCULAR REEDUCATION: CPT

## 2025-01-08 NOTE — PROGRESS NOTES
PHYSICAL / OCCUPATIONAL THERAPY - DAILY TREATMENT NOTE (updated )    Patient Name: Christelle Chaudhary    Date: 2025    : 1940  Insurance: Payor: MEDICARE / Plan: MEDICARE PART A AND B / Product Type: *No Product type* /      Patient  verified Yes     Visit #   Current / Total 9 24   Time   In / Out 1328 1419   Pain   In / Out 4 4   Subjective Functional Status/Changes: \"I know I promised you I would stop running around so much and aggravating my hip once the holidays were done. 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  If yes, update Summary List no       TREATMENT AREA =  Pain in left hip [M25.552]    If an interpreting service is utilized for treatment of this patient, the contents of this document represent the material reviewed with the patient via the .     OBJECTIVE    Therapeutic Procedures:  Tx Min Billable or 1:1 Min (if diff from Tx Min) Procedure, Rationale, Specifics   21 16 16896 Therapeutic Exercise (timed):  increase ROM, strength, coordination, balance, and proprioception to improve patient's ability to progress to PLOF and address remaining functional goals. (see flow sheet as applicable)     Details if applicable:       15  27142 Therapeutic Activity (timed):  use of dynamic activities replicating functional movements to increase ROM, strength, coordination, balance, and proprioception in order to improve patient's ability to progress to PLOF and address remaining functional goals.  (see flow sheet as applicable)     Details if applicable:     15  48960 Neuromuscular Re-Education (timed):  improve balance, coordination, kinesthetic sense, posture, core stability and proprioception to improve patient's ability to develop conscious control of individual muscles and awareness of position of extremities in order to progress to PLOF and

## 2025-01-13 ENCOUNTER — HOSPITAL ENCOUNTER (OUTPATIENT)
Facility: HOSPITAL | Age: 85
Setting detail: RECURRING SERIES
Discharge: HOME | End: 2025-01-16
Payer: MEDICARE

## 2025-01-13 PROCEDURE — 97110 THERAPEUTIC EXERCISES: CPT

## 2025-01-13 PROCEDURE — 97112 NEUROMUSCULAR REEDUCATION: CPT

## 2025-01-13 PROCEDURE — 97530 THERAPEUTIC ACTIVITIES: CPT

## 2025-01-13 NOTE — PROGRESS NOTES
PHYSICAL / OCCUPATIONAL THERAPY - DAILY TREATMENT NOTE (updated )    Patient Name: Christelle Chaudhary    Date: 2025    : 1940  Insurance: Payor: MEDICARE / Plan: MEDICARE PART A AND B / Product Type: *No Product type* /      Patient  verified Yes     Visit #   Current / Total 10 24   Time   In / Out 1130 1210   Pain   In / Out 4 2-3   Subjective Functional Status/Changes: \"I am doing my home exercise every other day.\"   Changes to:  Meds, Allergies, Med Hx, Sx Hx?  If yes, update Summary List no       TREATMENT AREA =  Pain in left hip [M25.552]    If an interpreting service is utilized for treatment of this patient, the contents of this document represent the material reviewed with the patient via the .     OBJECTIVE    Therapeutic Procedures:  Tx Min Billable or 1:1 Min (if diff from Tx Min) Procedure, Rationale, Specifics   18  12596 Therapeutic Exercise (timed):  increase ROM, strength, coordination, balance, and proprioception to improve patient's ability to progress to PLOF and address remaining functional goals. (see flow sheet as applicable)     Details if applicable:       12  43500 Therapeutic Activity (timed):  use of dynamic activities replicating functional movements to increase ROM, strength, coordination, balance, and proprioception in order to improve patient's ability to progress to PLOF and address remaining functional goals.  (see flow sheet as applicable)     Details if applicable:     10  49241 Neuromuscular Re-Education (timed):  improve balance, coordination, kinesthetic sense, posture, core stability and proprioception to improve patient's ability to develop conscious control of individual muscles and awareness of position of extremities in order to progress to PLOF and address remaining functional goals. (see flow sheet as applicable)     Details if applicable:     40  [facility] Totals Reminder: bill using total billable min of TIMED therapeutic procedures (example:

## 2025-01-17 ENCOUNTER — HOSPITAL ENCOUNTER (OUTPATIENT)
Facility: HOSPITAL | Age: 85
Setting detail: RECURRING SERIES
Discharge: HOME | End: 2025-01-20
Payer: MEDICARE

## 2025-01-17 PROCEDURE — 97530 THERAPEUTIC ACTIVITIES: CPT

## 2025-01-17 PROCEDURE — 97112 NEUROMUSCULAR REEDUCATION: CPT

## 2025-01-17 PROCEDURE — 97110 THERAPEUTIC EXERCISES: CPT

## 2025-01-17 NOTE — PROGRESS NOTES
PHYSICAL / OCCUPATIONAL THERAPY - DAILY TREATMENT NOTE (updated )    Patient Name: Christelle Chaudhary    Date: 2025    : 1940  Insurance: Payor: MEDICARE / Plan: MEDICARE PART A AND B / Product Type: *No Product type* /      Patient  verified Yes     Visit #   Current / Total 11 24   Time   In / Out 1409 1501   Pain   In / Out 3 3   Subjective Functional Status/Changes: \"Left hip is still sore, but better than it's been the past week or more.\"   Changes to:  Meds, Allergies, Med Hx, Sx Hx?  If yes, update Summary List no       TREATMENT AREA =  Pain in left hip [M25.552]    If an interpreting service is utilized for treatment of this patient, the contents of this document represent the material reviewed with the patient via the .     OBJECTIVE    Therapeutic Procedures:  Tx Min Billable or 1:1 Min (if diff from Tx Min) Procedure, Rationale, Specifics   22 15 20871 Therapeutic Exercise (timed):  increase ROM, strength, coordination, balance, and proprioception to improve patient's ability to progress to PLOF and address remaining functional goals. (see flow sheet as applicable)     Details if applicable:       15  10114 Therapeutic Activity (timed):  use of dynamic activities replicating functional movements to increase ROM, strength, coordination, balance, and proprioception in order to improve patient's ability to progress to PLOF and address remaining functional goals.  (see flow sheet as applicable)     Details if applicable:     15  68234 Neuromuscular Re-Education (timed):  improve balance, coordination, kinesthetic sense, posture, core stability and proprioception to improve patient's ability to develop conscious control of individual muscles and awareness of position of extremities in order to progress to PLOF and address remaining functional goals. (see flow sheet as applicable)     Details if applicable:     52 45 [facility] Totals Reminder: bill using total billable min of TIMED

## 2025-01-20 ENCOUNTER — HOSPITAL ENCOUNTER (OUTPATIENT)
Facility: HOSPITAL | Age: 85
Setting detail: RECURRING SERIES
Discharge: HOME | End: 2025-01-23
Payer: MEDICARE

## 2025-01-20 PROCEDURE — 97112 NEUROMUSCULAR REEDUCATION: CPT

## 2025-01-20 PROCEDURE — 97530 THERAPEUTIC ACTIVITIES: CPT

## 2025-01-20 PROCEDURE — 97110 THERAPEUTIC EXERCISES: CPT

## 2025-01-20 NOTE — PROGRESS NOTES
of TIMED therapeutic procedures (example: do not include dry needle or estim unattended, both untimed codes, in totals to left)  8-22 min = 1 unit; 23-37 min = 2 units; 38-52 min = 3 units; 53-67 min = 4 units; 68-82 min = 5 units   Total Total       TOTAL TREATMENT TIME:        41       [x]  Patient Education billed concurrently with other procedures   [x] Review HEP    [] Progressed/Changed HEP, detail:    [] Other detail:       Objective Information/Functional Measures/Assessment    Pt progressing slowly continuing to be limited by hip discomfort with loading. Cues for technique periodically as well as CGA for dynamic gait activities. Pt left in no apparent distress.    Patient will continue to benefit from skilled PT / OT services to modify and progress therapeutic interventions, analyze and address functional mobility deficits, analyze and address ROM deficits, analyze and address strength deficits, analyze and address soft tissue restrictions, analyze and cue for proper movement patterns, analyze and modify for postural abnormalities, analyze and address imbalance/dizziness, and instruct in home and community integration to address functional deficits and attain remaining goals.    Progress toward goals / Updated goals:  []  See Progress Note/Recertification    Short Term Goals: To be accomplished in 4 weeks:                 Patient will report compliance with HEP 1x/day to aid in rehabilitation program.                 Status at IE: Patient instructed in and provided written copy of initial Home Exercise Program.                 Current: Met, 1/13/2025                    Patient will increase left hip ROM to flex 120 and ext +12  to aid in completion of ADLs                 Status at IE:left hip flex 71, ext -7 from neutral                 Current: left hip flex 117 degrees, ext +8 past neutral 1/8/25     Long Term Goals: To be accomplished in 8 weeks:                 Patient will increase Kunal LE strength to

## 2025-01-22 ENCOUNTER — APPOINTMENT (OUTPATIENT)
Facility: HOSPITAL | Age: 85
End: 2025-01-22
Payer: MEDICARE

## 2025-01-22 ENCOUNTER — TELEPHONE (OUTPATIENT)
Facility: HOSPITAL | Age: 85
End: 2025-01-22

## 2025-01-27 ENCOUNTER — HOSPITAL ENCOUNTER (OUTPATIENT)
Facility: HOSPITAL | Age: 85
Setting detail: RECURRING SERIES
Discharge: HOME | End: 2025-01-30
Payer: MEDICARE

## 2025-01-27 PROCEDURE — 97530 THERAPEUTIC ACTIVITIES: CPT

## 2025-01-27 PROCEDURE — 97110 THERAPEUTIC EXERCISES: CPT

## 2025-01-27 PROCEDURE — 97112 NEUROMUSCULAR REEDUCATION: CPT

## 2025-01-27 NOTE — PROGRESS NOTES
PHYSICAL / OCCUPATIONAL THERAPY - DAILY TREATMENT NOTE (updated )    Patient Name: Christelle Chaudhary    Date: 2025    : 1940  Insurance: Payor: MEDICARE / Plan: MEDICARE PART A AND B / Product Type: *No Product type* /      Patient  verified Yes     Visit #   Current / Total 13 24   Time   In / Out 11:30 am 12:19 pm   Pain   In / Out 4 5   Subjective Functional Status/Changes: Pt reports she still has some hip soreness and pain without much recent change.   Changes to:  Meds, Allergies, Med Hx, Sx Hx?  If yes, update Summary List no       TREATMENT AREA =  Pain in left hip [M25.552]    If an interpreting service is utilized for treatment of this patient, the contents of this document represent the material reviewed with the patient via the .     OBJECTIVE    Therapeutic Procedures:  Tx Min Billable or 1:1 Min (if diff from Tx Min) Procedure, Rationale, Specifics   19  38010 Therapeutic Exercise (timed):  increase ROM, strength, coordination, balance, and proprioception to improve patient's ability to progress to PLOF and address remaining functional goals. (see flow sheet as applicable)     Details if applicable:       15 15 08547 Therapeutic Activity (timed):  use of dynamic activities replicating functional movements to increase ROM, strength, coordination, balance, and proprioception in order to improve patient's ability to progress to PLOF and address remaining functional goals.  (see flow sheet as applicable)     Details if applicable:     15 15 08919 Neuromuscular Re-Education (timed):  improve balance, coordination, kinesthetic sense, posture, core stability and proprioception to improve patient's ability to develop conscious control of individual muscles and awareness of position of extremities in order to progress to PLOF and address remaining functional goals. (see flow sheet as applicable)     Details if applicable:     49 48 [facility] Totals Reminder: bill using total billable

## 2025-01-29 ENCOUNTER — HOSPITAL ENCOUNTER (OUTPATIENT)
Facility: HOSPITAL | Age: 85
Setting detail: RECURRING SERIES
Discharge: HOME | End: 2025-02-01
Payer: MEDICARE

## 2025-01-29 PROCEDURE — 97112 NEUROMUSCULAR REEDUCATION: CPT

## 2025-01-29 PROCEDURE — 97530 THERAPEUTIC ACTIVITIES: CPT

## 2025-01-29 PROCEDURE — 97110 THERAPEUTIC EXERCISES: CPT

## 2025-01-29 NOTE — PROGRESS NOTES
[facility] at [facility]  [address] [address]  Phone: [phone]   Fax: [phone]    Discharge Summary    Patient name: Christelle Chaudhary Start of Care: 2024   Referral source: Omid Gaines III, MD : 1940               Medical Diagnosis: Pain in left hip [M25.552]      Onset Date: 24               Treatment Diagnosis:  M25.552  LEFT HIP PAIN                                           Prior Hospitalization: see medical history Provider#: [id number]   Medications: Verified on Patient Summary List      Comorbidities: hypothyroid, right JUNIOR  Prior Level of Function: ambulatory community distances without device.      Reporting Period: 24-25     Visits from Start of Care: 14                                    Missed Visits: 3    Goals/Measure of Progress:  Short Term Goals: To be accomplished in 4 weeks:                 Patient will report compliance with HEP 1x/day to aid in rehabilitation program.                 Status at IE: Patient instructed in and provided written copy of initial Home Exercise Program.                 Current: Met, 2025                    Patient will increase left hip ROM to flex 120 and ext +12  to aid in completion of ADLs                 Status at IE:left hip flex 71, ext -7 from neutral                 Current: left hip flex 120 degrees, ext +9 past neutral  Near Met, 25     Long Term Goals: To be accomplished in 8 weeks:                 Patient will increase Kunal LE strength to 5/5 MMT throughout to aid in completion of ADLs.                 Status at IE:left hip 3-/5                 Current: left hip 4/5               25                    Patient will report pain no greater than 1-2/10 throughout entire day to aid in completion of ADLs                 Status at IE:4-6/10                 Current:2-4/10        25                    Patient will decrease TUG by 20  seconds to demonstrate increased safety 
Physical Therapy Discharge Instructions    [facility] at [facility]  [address] [address]  Phone: [phone]   Fax: [phone]      Patient: Christelle Chaudhary  : 1940    Continue Home Exercise Program 2 times per day for 6 weeks, then decrease to 4 times per week      Continue with    [x] Ice  as needed      [] Heat           Follow up with MD:     [] Upon completion of therapy     [x] As needed      Recommendations:     [x]   Return to activity with home program    []   Return to activity with the following modifications:       []Post Rehab Program    []Join Independent aquatic program     []Return to/join local gym      Additional Comments: Please contact clinic at above phone number if you have any questions regarding Home Exercise Program or self care instructions.    
sheet as applicable)     Details if applicable:     48 43 [facility] Totals Reminder: bill using total billable min of TIMED therapeutic procedures (example: do not include dry needle or estim unattended, both untimed codes, in totals to left)  8-22 min = 1 unit; 23-37 min = 2 units; 38-52 min = 3 units; 53-67 min = 4 units; 68-82 min = 5 units   Total Total       TOTAL TREATMENT TIME:        48       [x]  Patient Education billed concurrently with other procedures   [x] Review HEP    [] Progressed/Changed HEP, detail:    [] Other detail:       Objective Information/Functional Measures/Assessment    Patient has been well motivated, consistent and diligent with PT and HEP such that they are making steady progress towards goals. Patient has achieved 2/2 Short Term Goals and 2/5 of Long Term Goals and is making good progress towards remaining goals. Patient has been provided resistance band for home use and written copy of full HEP and is now self selecting to discontinue ongoing PT as she feels she can now continue to progress independently. Patient has been instructed to contact MD or PT clinic if she has any difficulty progressing independently.      Progress toward goals / Updated goals:  [x]  See Discharge Summary    Short Term Goals: To be accomplished in 4 weeks:                 Patient will report compliance with HEP 1x/day to aid in rehabilitation program.                 Status at IE: Patient instructed in and provided written copy of initial Home Exercise Program.                 Current: Met, 1/13/2025                    Patient will increase left hip ROM to flex 120 and ext +12  to aid in completion of ADLs                 Status at IE:left hip flex 71, ext -7 from neutral                 Current: left hip flex 120 degrees, ext +9 past neutral  Near Met, 1/29/25     Long Term Goals: To be accomplished in 8 weeks:                 Patient will increase Kunal LE strength to 5/5 MMT throughout to aid in completion of

## (undated) DEVICE — PACK SURG CUST ANTR HIP MIH

## (undated) DEVICE — DRESSING FOAM 4X8IN DISP POSTOP MEPILEX BORD AG

## (undated) DEVICE — GLOVE SURG SZ 75 L12IN FNGR THK79MIL GRN LTX FREE

## (undated) DEVICE — GLOVE ORANGE PI 8   MSG9080

## (undated) DEVICE — GOWN,SIRUS,NONRNF,SETINSLV,XL,20/CS: Brand: MEDLINE

## (undated) DEVICE — SOLUTION IV 250ML 0.9% SOD CHL CLR INJ FLX BG CONT PRT CLSR

## (undated) DEVICE — APPLICATOR MEDICATED 26 CC SOLUTION HI LT ORNG CHLORAPREP

## (undated) DEVICE — SUTURE VICRYL + SZ 2-0 L36IN ABSRB UD L36MM CT-1 1/2 CIR VCP945H

## (undated) DEVICE — GLOVE SURG SZ 85 L12IN THK75MIL DK GRN LTX FREE

## (undated) DEVICE — ADHESIVE SKIN CLOSURE WND 8.661X1.5 IN 22 CM LIQUIBAND SECUR

## (undated) DEVICE — SOLUTION IV 1000ML LAC RINGERS PH 6.5 INJ USP VIAFLX PLAS

## (undated) DEVICE — STRYKER PERFORMANCE SERIES SAGITTAL BLADE: Brand: STRYKER PERFORMANCE SERIES

## (undated) DEVICE — SUTURE VICRYL + SZ 1 L36IN ABSRB UD L36MM CT-1 1/2 CIR VCP947H

## (undated) DEVICE — SOLUTION IRRIG 500ML 0.9% SOD CHLO USP POUR PLAS BTL

## (undated) DEVICE — ELECTRODE PT RET AD L9FT HI MOIST COND ADH HYDRGEL CORDED

## (undated) DEVICE — HANDPIECE SET WITH HIGH FLOW TIP AND SUCTION TUBE: Brand: INTERPULSE

## (undated) DEVICE — GLOVE SURG SZ 8 L12IN THK75MIL DK GRN LTX FREE

## (undated) DEVICE — BLUNTFILL: Brand: MONOJECT

## (undated) DEVICE — SYRINGE 20ML LL S/C 50

## (undated) DEVICE — HYPODERMIC SAFETY NEEDLE: Brand: MAGELLAN

## (undated) DEVICE — GARMENT,MEDLINE,DVT,INT,CALF,MED, GEN2: Brand: MEDLINE

## (undated) DEVICE — THE CANADY HYBRID PLASMA SCALPEL IS AN ELECTROSURGICAL PLASMA SCALPEL THAT USES AN 85MM BENDABLE PADDLE BLADE TIP. THE ELECTROSURGICAL PLASMA SCALPEL IS USED TO SIMULTANEOUSLY CUT AND COAGULATE BIOLOGICAL TISSUE.: Brand: CANADY HYBRID PLASMA PADDLE BLADE